# Patient Record
Sex: MALE | Race: WHITE | NOT HISPANIC OR LATINO | Employment: FULL TIME | URBAN - METROPOLITAN AREA
[De-identification: names, ages, dates, MRNs, and addresses within clinical notes are randomized per-mention and may not be internally consistent; named-entity substitution may affect disease eponyms.]

---

## 2019-01-03 ENCOUNTER — TELEPHONE (OUTPATIENT)
Dept: FAMILY MEDICINE | Facility: CLINIC | Age: 44
End: 2019-01-03

## 2019-01-03 DIAGNOSIS — B07.0 PLANTAR WART: Primary | ICD-10-CM

## 2019-01-09 ENCOUNTER — OFFICE VISIT (OUTPATIENT)
Dept: PODIATRY | Facility: CLINIC | Age: 44
End: 2019-01-09
Payer: COMMERCIAL

## 2019-01-09 VITALS
WEIGHT: 148 LBS | DIASTOLIC BLOOD PRESSURE: 88 MMHG | BODY MASS INDEX: 24.66 KG/M2 | HEART RATE: 84 BPM | SYSTOLIC BLOOD PRESSURE: 137 MMHG | HEIGHT: 65 IN

## 2019-01-09 DIAGNOSIS — M79.671 FOOT PAIN, BILATERAL: ICD-10-CM

## 2019-01-09 DIAGNOSIS — M79.672 FOOT PAIN, BILATERAL: ICD-10-CM

## 2019-01-09 DIAGNOSIS — M72.2 PLANTAR FIBROMATOSIS: Primary | ICD-10-CM

## 2019-01-09 PROCEDURE — 99203 OFFICE O/P NEW LOW 30 MIN: CPT | Mod: 25,S$GLB,, | Performed by: PODIATRIST

## 2019-01-09 PROCEDURE — 3008F PR BODY MASS INDEX (BMI) DOCUMENTED: ICD-10-PCS | Mod: CPTII,S$GLB,, | Performed by: PODIATRIST

## 2019-01-09 PROCEDURE — 99999 PR PBB SHADOW E&M-EST. PATIENT-LVL III: ICD-10-PCS | Mod: PBBFAC,,, | Performed by: PODIATRIST

## 2019-01-09 PROCEDURE — 99203 PR OFFICE/OUTPT VISIT, NEW, LEVL III, 30-44 MIN: ICD-10-PCS | Mod: 25,S$GLB,, | Performed by: PODIATRIST

## 2019-01-09 PROCEDURE — 3008F BODY MASS INDEX DOCD: CPT | Mod: CPTII,S$GLB,, | Performed by: PODIATRIST

## 2019-01-09 PROCEDURE — 99999 PR PBB SHADOW E&M-EST. PATIENT-LVL III: CPT | Mod: PBBFAC,,, | Performed by: PODIATRIST

## 2019-01-09 PROCEDURE — 29540 STRAPPING ANKLE &/FOOT: CPT | Mod: 50,S$GLB,, | Performed by: PODIATRIST

## 2019-01-09 PROCEDURE — 29540 PR STRAPPING; ANKLE &/OR FOOT: ICD-10-PCS | Mod: 50,S$GLB,, | Performed by: PODIATRIST

## 2019-01-09 RX ORDER — LIDOCAINE HYDROCHLORIDE 20 MG/ML
JELLY TOPICAL
Qty: 30 ML | Refills: 2 | Status: SHIPPED | OUTPATIENT
Start: 2019-01-09 | End: 2019-04-15

## 2019-01-09 NOTE — LETTER
January 9, 2019      Arthur Vidal Jr., MD  [address]  [address]  [facility] [address]           [facility] - Podiatry  [address]  [place] [address]  Phone: [phone]          Patient: Lance Hampton   MR Number: [id number]   YOB: 1975   Date of Visit: 1/9/2019       Dear Dr. Arthur Vidal Jr.:    Thank you for referring Lance Hampton to me for evaluation. Attached you will find relevant portions of my assessment and plan of care.    If you have questions, please do not hesitate to call me. I look forward to following Lance Hampton along with you.    Sincerely,    Hector Rogers, DPKATARZYNA    Enclosure  CC:  No Recipients    If you would like to receive this communication electronically, please contact [email or web address] or [phone] to request more information on [employer] Link access.    For providers and/or their staff who would like to refer a patient to [facility], please contact us through our one-stop-shop provider referral line, [facility], at [phone].    If you feel you have received this communication in error or would no longer like to receive these types of communications, please e-mail [email or web address]

## 2019-01-09 NOTE — PROGRESS NOTES
Subjective:      Patient ID: Lance Hampton is a 43 y.o. male.    Chief Complaint: Plantar Warts    Throbbing burning pain with bump bottom arches right and left.  Gradual onset, worsening over past several months, aggravated by increased weight bearing, shoe gear, pressure.  No previous medical treatment.  OTC pain med not helping. Denies trauma, surgery.    Review of Systems   Constitution: Negative for chills, diaphoresis, fever, malaise/fatigue and night sweats.   Cardiovascular: Negative for claudication, cyanosis, leg swelling and syncope.   Skin: Positive for suspicious lesions. Negative for color change, dry skin, nail changes, rash and unusual hair distribution.   Musculoskeletal: Negative for falls, joint pain, joint swelling, muscle cramps, muscle weakness and stiffness.   Gastrointestinal: Negative for constipation, diarrhea, nausea and vomiting.   Neurological: Negative for brief paralysis, disturbances in coordination, focal weakness, numbness, paresthesias, sensory change and tremors.           Objective:      Physical Exam   Constitutional: He is oriented to person, place, and time. He appears well-developed and well-nourished. He is cooperative. No distress.   Cardiovascular:   Pulses:       Popliteal pulses are 2+ on the right side, and 2+ on the left side.        Dorsalis pedis pulses are 2+ on the right side, and 2+ on the left side.        Posterior tibial pulses are 2+ on the right side, and 2+ on the left side.   Capillary refill 3 seconds all toes/distal feet, all toes/both feet warm to touch.      Negative lymphadenopathy bilateral popliteal fossa and tarsal tunnel.      Negavie lower extremity edema bilateral.     Musculoskeletal:        Right ankle: He exhibits normal range of motion, no swelling, no ecchymosis, no deformity, no laceration and normal pulse. Achilles tendon normal. Achilles tendon exhibits no pain, no defect and normal Sandhu's test results.   Normal angle, base,  station of gait. All ten toes without clubbing, cyanosis, or signs of ischemia.  No pain to palpation bilateral lower extremities.  Range of motion, stability, muscle strength, and muscle tone normal bilateral feet and legs.     Lymphadenopathy: No inguinal adenopathy noted on the right or left side.   Negative lymphadenopathy bilateral popliteal fossa and tarsal tunnel.    Negative lymphangitic streaking bilateral feet/ankles/legs.   Neurological: He is alert and oriented to person, place, and time. He has normal strength. He displays no atrophy and no tremor. No sensory deficit. He exhibits normal muscle tone. Gait normal.   Reflex Scores:       Patellar reflexes are 2+ on the right side and 2+ on the left side.       Achilles reflexes are 2+ on the right side and 2+ on the left side.  Negative tinel sign to percussion sural, superficial peroneal, deep peroneal, saphenous, and posterior tibial nerves right and left ankles and feet.     Skin: Skin is warm, dry and intact. Capillary refill takes 2 to 3 seconds. No abrasion, no bruising, no burn, no ecchymosis, no laceration, no lesion and no rash noted. He is not diaphoretic. No cyanosis or erythema. No pallor. Nails show no clubbing.     Painful mass plantar fascial medial band right and left with non mobile mass associated  without ulceration, drainage, pus, tracking, fluctuance, malodor, or cardinal signs infection.     Otherwise, Skin is normal age and health appropriate color, turgor, texture, and temperature bilateral lower extremities without ulceration, hyperpigmentation, discoloration, masses nodules or cords palpated.  No ecchymosis, erythema, edema, or cardinal signs of infection bilateral lower extremities.     Psychiatric: He has a normal mood and affect.             Assessment:       Encounter Diagnoses   Name Primary?    Plantar fibromatosis Yes    Foot pain, bilateral          Plan:       Lance was seen today for plantar warts.    Diagnoses and all  orders for this visit:    Plantar fibromatosis  -     ORTHOTIC DEVICE (DME)    Foot pain, bilateral  -     ORTHOTIC DEVICE (DME)    Other orders  -     lidocaine HCL 2% (XYLOCAINE) 2 % jelly; Apply topically as needed.      I counseled the patient on his conditions, their implications and medical management.        Patient will stretch the tendo achilles complex three times daily as demonstrated in the office.  Literature was dispensed illustrating proper stretching technique.    I applied a plantar rest strapping to the patient's  Right and left foot to offload symptomatic area, support the arch, and relieve pain.    Patient will obtain over the counter arch supports and wear them in shoes whenever possible.  Athletic shoes intended for walking or running are usually best.    The patient was advised that NSAID-type medications have two very important potential side effects: gastrointestinal irritation including hemorrhage and renal injuries. He was asked to take the medication with food and to stop if he experiences any GI upset. I asked him to call for vomiting, abdominal pain or black/bloody stools. The patient expresses understanding of these issues and questions were answered.    Discussed conservative treatment with shoes of adequate dimensions, material, and style to alleviate symptoms and delay or prevent surgical intervention.    Rx xrays, lidocaine.          Follow-up if symptoms worsen or fail to improve.

## 2019-04-15 ENCOUNTER — OFFICE VISIT (OUTPATIENT)
Dept: FAMILY MEDICINE | Facility: CLINIC | Age: 44
End: 2019-04-15
Attending: FAMILY MEDICINE
Payer: COMMERCIAL

## 2019-04-15 VITALS
SYSTOLIC BLOOD PRESSURE: 114 MMHG | OXYGEN SATURATION: 97 % | BODY MASS INDEX: 24.92 KG/M2 | DIASTOLIC BLOOD PRESSURE: 70 MMHG | HEART RATE: 77 BPM | WEIGHT: 146 LBS | HEIGHT: 64 IN

## 2019-04-15 DIAGNOSIS — E78.5 HYPERLIPIDEMIA, UNSPECIFIED HYPERLIPIDEMIA TYPE: ICD-10-CM

## 2019-04-15 DIAGNOSIS — R63.4 UNINTENTIONAL WEIGHT LOSS: Primary | ICD-10-CM

## 2019-04-15 DIAGNOSIS — K76.0 FATTY LIVER: ICD-10-CM

## 2019-04-15 DIAGNOSIS — K21.9 GASTROESOPHAGEAL REFLUX DISEASE WITHOUT ESOPHAGITIS: ICD-10-CM

## 2019-04-15 DIAGNOSIS — F10.90 ALCOHOL INTAKE ABOVE RECOMMENDED SENSIBLE LIMITS: ICD-10-CM

## 2019-04-15 DIAGNOSIS — Z12.5 PROSTATE CANCER SCREENING: ICD-10-CM

## 2019-04-15 PROCEDURE — 3008F BODY MASS INDEX DOCD: CPT | Mod: CPTII,S$GLB,, | Performed by: FAMILY MEDICINE

## 2019-04-15 PROCEDURE — 99215 PR OFFICE/OUTPT VISIT, EST, LEVL V, 40-54 MIN: ICD-10-PCS | Mod: S$GLB,,, | Performed by: FAMILY MEDICINE

## 2019-04-15 PROCEDURE — 99999 PR PBB SHADOW E&M-EST. PATIENT-LVL III: ICD-10-PCS | Mod: PBBFAC,,, | Performed by: FAMILY MEDICINE

## 2019-04-15 PROCEDURE — 99999 PR PBB SHADOW E&M-EST. PATIENT-LVL III: CPT | Mod: PBBFAC,,, | Performed by: FAMILY MEDICINE

## 2019-04-15 PROCEDURE — 99215 OFFICE O/P EST HI 40 MIN: CPT | Mod: S$GLB,,, | Performed by: FAMILY MEDICINE

## 2019-04-15 PROCEDURE — 3008F PR BODY MASS INDEX (BMI) DOCUMENTED: ICD-10-PCS | Mod: CPTII,S$GLB,, | Performed by: FAMILY MEDICINE

## 2019-04-15 NOTE — PROGRESS NOTES
"Subjective:       Patient ID: Lance Hampton is a 43 y.o. male.    Chief Complaint: Weight Loss    HPI   The patient is concerned about a possible 10 lb weight loss over the past few months..  He admits to poor sleep; decreased appetite; high alcohol intake. No exercise.      Patient Active Problem List   Diagnosis    Gastroesophageal reflux disease without esophagitis    Fatty liver    AR (allergic rhinitis)    Hyperlipidemia    Biceps tendonitis on left    Left shoulder pain    Alcohol intake above recommended sensible limits     No current outpatient medications on file.    The following portions of the patient's history were reviewed and updated as appropriate: allergies, past family history, past medical history, past social history and past surgical history.    Review of Systems   Constitutional: Positive for appetite change and unexpected weight change. Negative for fatigue.   HENT: Negative for ear discharge, ear pain, hearing loss, tinnitus and voice change.    Eyes: Negative for pain.   Respiratory: Negative for cough and shortness of breath.    Cardiovascular: Negative for chest pain, palpitations and leg swelling.   Gastrointestinal: Positive for constipation. Negative for abdominal pain, blood in stool, diarrhea, nausea and vomiting.   Genitourinary: Negative for decreased urine volume, difficulty urinating, dysuria, enuresis, frequency, hematuria and urgency.   Musculoskeletal: Negative for arthralgias, back pain and myalgias.   Skin: Negative for rash.   Neurological: Negative for dizziness, weakness, light-headedness and headaches.   Hematological: Does not bruise/bleed easily.   Psychiatric/Behavioral: Positive for sleep disturbance. Negative for dysphoric mood. The patient is not nervous/anxious.          Objective:      /70 (BP Location: Left arm, Patient Position: Sitting, BP Method: Small (Manual))   Pulse 77   Ht 5' 4" (1.626 m)   Wt 66.2 kg (146 lb)   SpO2 97%   BMI " "25.06 kg/m²     Physical Exam   Constitutional: He is oriented to person, place, and time. He appears well-developed and well-nourished. He is cooperative.   HENT:   Head: Normocephalic and atraumatic.   Right Ear: External ear normal.   Left Ear: External ear normal.   Nose: Nose normal.   Mouth/Throat: Oropharynx is clear and moist and mucous membranes are normal. No oropharyngeal exudate.   Eyes: Conjunctivae are normal. No scleral icterus.   Neck: Neck supple. No JVD present. Carotid bruit is not present. No thyromegaly present.   Cardiovascular: Normal rate, regular rhythm, normal heart sounds and normal pulses. Exam reveals no gallop and no friction rub.   No murmur heard.  Pulmonary/Chest: Effort normal and breath sounds normal. He has no wheezes. He has no rhonchi. He has no rales.   Abdominal: Soft. Bowel sounds are normal. He exhibits no distension and no mass. There is no splenomegaly or hepatomegaly. There is no tenderness.   Musculoskeletal: Normal range of motion. He exhibits no edema or tenderness.   Lymphadenopathy:     He has no cervical adenopathy.     He has no axillary adenopathy.   Neurological: He is alert and oriented to person, place, and time. He has normal strength and normal reflexes. No cranial nerve deficit or sensory deficit. Coordination normal.   Skin: Skin is warm and dry.   Psychiatric: He has a normal mood and affect.   Vitals reviewed.        Assessment:       1. Unintentional weight loss    2. Alcohol intake above recommended sensible limits    3. Fatty liver    4. Hyperlipidemia, unspecified hyperlipidemia type    5. Gastroesophageal reflux disease without esophagitis        Plan:       Labs (see Orders).  Recommended reduce EtOH intake by 50%.  Consider sleep aid HS to restore restful sleep pattern.    "This note will not be shared with the patient."  "

## 2019-04-15 NOTE — PATIENT INSTRUCTIONS
Lance,     We are always striving for excellence. Should you receive a patient experience survey in the mail, we would appreciate if you would take a few moments to give us your feedback. These surveys let us know our strengths as well as areas of opportunity for improvement to better serve you.    Thank you for your time,  Oneyda Gabriel LPN    Test results will be communicated to you via : [facility], Telephone or Letter.   If you have not received test results in one week, please contact the clinic at   [phone].

## 2019-04-16 ENCOUNTER — LAB VISIT (OUTPATIENT)
Dept: LAB | Facility: HOSPITAL | Age: 44
End: 2019-04-16
Attending: FAMILY MEDICINE
Payer: COMMERCIAL

## 2019-04-16 DIAGNOSIS — Z12.5 PROSTATE CANCER SCREENING: ICD-10-CM

## 2019-04-16 DIAGNOSIS — R63.4 UNINTENTIONAL WEIGHT LOSS: ICD-10-CM

## 2019-04-16 DIAGNOSIS — K76.0 FATTY LIVER: ICD-10-CM

## 2019-04-16 DIAGNOSIS — F10.90 ALCOHOL INTAKE ABOVE RECOMMENDED SENSIBLE LIMITS: ICD-10-CM

## 2019-04-16 DIAGNOSIS — E78.5 HYPERLIPIDEMIA, UNSPECIFIED HYPERLIPIDEMIA TYPE: ICD-10-CM

## 2019-04-16 LAB
ALBUMIN SERPL BCP-MCNC: 4.5 G/DL (ref 3.5–5.2)
ALP SERPL-CCNC: 97 U/L (ref 55–135)
ALT SERPL W/O P-5'-P-CCNC: 242 U/L (ref 10–44)
ANION GAP SERPL CALC-SCNC: 11 MMOL/L (ref 8–16)
AST SERPL-CCNC: 204 U/L (ref 10–40)
BASOPHILS # BLD AUTO: 0.03 K/UL (ref 0–0.2)
BASOPHILS NFR BLD: 0.6 % (ref 0–1.9)
BILIRUB SERPL-MCNC: 0.8 MG/DL (ref 0.1–1)
BUN SERPL-MCNC: 5 MG/DL (ref 6–20)
CALCIUM SERPL-MCNC: 10.1 MG/DL (ref 8.7–10.5)
CHLORIDE SERPL-SCNC: 99 MMOL/L (ref 95–110)
CO2 SERPL-SCNC: 29 MMOL/L (ref 23–29)
COMPLEXED PSA SERPL-MCNC: 0.74 NG/ML (ref 0–4)
CREAT SERPL-MCNC: 0.8 MG/DL (ref 0.5–1.4)
CRP SERPL-MCNC: 2.7 MG/L (ref 0–3.19)
DIFFERENTIAL METHOD: ABNORMAL
EOSINOPHIL # BLD AUTO: 0.3 K/UL (ref 0–0.5)
EOSINOPHIL NFR BLD: 5.7 % (ref 0–8)
ERYTHROCYTE [DISTWIDTH] IN BLOOD BY AUTOMATED COUNT: 12.8 % (ref 11.5–14.5)
EST. GFR  (AFRICAN AMERICAN): >60 ML/MIN/1.73 M^2
EST. GFR  (NON AFRICAN AMERICAN): >60 ML/MIN/1.73 M^2
GLUCOSE SERPL-MCNC: 78 MG/DL (ref 70–110)
HCT VFR BLD AUTO: 45.7 % (ref 40–54)
HGB BLD-MCNC: 15.1 G/DL (ref 14–18)
IMM GRANULOCYTES # BLD AUTO: 0.01 K/UL (ref 0–0.04)
IMM GRANULOCYTES NFR BLD AUTO: 0.2 % (ref 0–0.5)
LYMPHOCYTES # BLD AUTO: 1.2 K/UL (ref 1–4.8)
LYMPHOCYTES NFR BLD: 26.1 % (ref 18–48)
MCH RBC QN AUTO: 33 PG (ref 27–31)
MCHC RBC AUTO-ENTMCNC: 33 G/DL (ref 32–36)
MCV RBC AUTO: 100 FL (ref 82–98)
MONOCYTES # BLD AUTO: 0.6 K/UL (ref 0.3–1)
MONOCYTES NFR BLD: 12.5 % (ref 4–15)
NEUTROPHILS # BLD AUTO: 2.6 K/UL (ref 1.8–7.7)
NEUTROPHILS NFR BLD: 54.9 % (ref 38–73)
NRBC BLD-RTO: 0 /100 WBC
PLATELET # BLD AUTO: 328 K/UL (ref 150–350)
PMV BLD AUTO: 10.2 FL (ref 9.2–12.9)
POTASSIUM SERPL-SCNC: 4.4 MMOL/L (ref 3.5–5.1)
PROT SERPL-MCNC: 8.2 G/DL (ref 6–8.4)
RBC # BLD AUTO: 4.57 M/UL (ref 4.6–6.2)
SODIUM SERPL-SCNC: 139 MMOL/L (ref 136–145)
T4 FREE SERPL-MCNC: 0.79 NG/DL (ref 0.71–1.51)
TSH SERPL DL<=0.005 MIU/L-ACNC: 1.03 UIU/ML (ref 0.4–4)
URATE SERPL-MCNC: 5.3 MG/DL (ref 3.4–7)
WBC # BLD AUTO: 4.71 K/UL (ref 3.9–12.7)

## 2019-04-16 PROCEDURE — 86141 C-REACTIVE PROTEIN HS: CPT

## 2019-04-16 PROCEDURE — 36415 COLL VENOUS BLD VENIPUNCTURE: CPT | Mod: PO

## 2019-04-16 PROCEDURE — 80053 COMPREHEN METABOLIC PANEL: CPT

## 2019-04-16 PROCEDURE — 80061 LIPID PANEL: CPT

## 2019-04-16 PROCEDURE — 84550 ASSAY OF BLOOD/URIC ACID: CPT

## 2019-04-16 PROCEDURE — 84439 ASSAY OF FREE THYROXINE: CPT

## 2019-04-16 PROCEDURE — 84153 ASSAY OF PSA TOTAL: CPT

## 2019-04-16 PROCEDURE — 84443 ASSAY THYROID STIM HORMONE: CPT

## 2019-04-16 PROCEDURE — 85025 COMPLETE CBC W/AUTO DIFF WBC: CPT

## 2019-04-16 PROCEDURE — 82977 ASSAY OF GGT: CPT

## 2019-04-17 ENCOUNTER — TELEPHONE (OUTPATIENT)
Dept: FAMILY MEDICINE | Facility: CLINIC | Age: 44
End: 2019-04-17

## 2019-04-17 DIAGNOSIS — K70.10 ALCOHOLIC STEATOHEPATITIS: Primary | ICD-10-CM

## 2019-04-17 LAB — GGT SERPL-CCNC: 670 U/L (ref 8–55)

## 2019-04-17 NOTE — TELEPHONE ENCOUNTER
Reviewed results with patient by phone.  He agrees to significant reduction in alcohol intake, but not complete abstention.

## 2019-04-20 LAB
CHOLEST SERPL-MCNC: 302 MG/DL
HDL SERPL QN: 10.5 NM
HDL SERPL-SCNC: 37.3 UMOL/L
HDLC SERPL-MCNC: 91 MG/DL (ref 40–59)
HLD.LARGE SERPL-SCNC: >17 UMOL/L
LDL SERPL QN: 21.8 NM
LDL SERPL-SCNC: 2111 NMOL/L
LDL SMALL SERPL-SCNC: 414 NMOL/L
LDLC SERPL CALC-MCNC: 193 MG/DL
PATHOLOGY STUDY: ABNORMAL
TRIGL SERPL-MCNC: 88 MG/DL (ref 30–149)
VLDL LARGE SERPL-SCNC: 2 NMOL/L
VLDL SERPL QN: 47.5 NM

## 2019-04-25 ENCOUNTER — HOSPITAL ENCOUNTER (OUTPATIENT)
Dept: RADIOLOGY | Facility: HOSPITAL | Age: 44
Discharge: HOME | End: 2019-04-25
Attending: FAMILY MEDICINE
Payer: COMMERCIAL

## 2019-04-25 DIAGNOSIS — K70.10 ALCOHOLIC STEATOHEPATITIS: ICD-10-CM

## 2019-04-25 PROCEDURE — 91200 LIVER ELASTOGRAPHY: CPT | Mod: TC

## 2019-04-25 PROCEDURE — 91200 LIVER ELASTOGRAPHY: CPT | Mod: 26,,, | Performed by: RADIOLOGY

## 2019-04-25 PROCEDURE — 91200 US ELASTOGRAPHY LIVER: ICD-10-PCS | Mod: 26,,, | Performed by: RADIOLOGY

## 2019-04-26 ENCOUNTER — PATIENT MESSAGE (OUTPATIENT)
Dept: FAMILY MEDICINE | Facility: CLINIC | Age: 44
End: 2019-04-26

## 2019-04-29 ENCOUNTER — PATIENT MESSAGE (OUTPATIENT)
Dept: FAMILY MEDICINE | Facility: CLINIC | Age: 44
End: 2019-04-29

## 2019-04-29 DIAGNOSIS — K70.10 ALCOHOLIC STEATOHEPATITIS: Primary | ICD-10-CM

## 2019-05-23 ENCOUNTER — OFFICE VISIT (OUTPATIENT)
Dept: HEPATOLOGY | Facility: CLINIC | Age: 44
End: 2019-05-23
Payer: COMMERCIAL

## 2019-05-23 ENCOUNTER — LAB VISIT (OUTPATIENT)
Dept: LAB | Facility: HOSPITAL | Age: 44
End: 2019-05-23
Payer: COMMERCIAL

## 2019-05-23 VITALS
HEIGHT: 64 IN | HEART RATE: 103 BPM | WEIGHT: 147.5 LBS | RESPIRATION RATE: 16 BRPM | SYSTOLIC BLOOD PRESSURE: 132 MMHG | TEMPERATURE: 99 F | BODY MASS INDEX: 25.18 KG/M2 | DIASTOLIC BLOOD PRESSURE: 76 MMHG

## 2019-05-23 DIAGNOSIS — R74.8 ELEVATED LIVER ENZYMES: ICD-10-CM

## 2019-05-23 DIAGNOSIS — R79.89 ELEVATED FERRITIN: ICD-10-CM

## 2019-05-23 DIAGNOSIS — R74.8 ELEVATED LIVER ENZYMES: Primary | ICD-10-CM

## 2019-05-23 LAB
ALBUMIN SERPL BCP-MCNC: 4.3 G/DL (ref 3.5–5.2)
ALP SERPL-CCNC: 104 U/L (ref 55–135)
ALT SERPL W/O P-5'-P-CCNC: 257 U/L (ref 10–44)
ANION GAP SERPL CALC-SCNC: 8 MMOL/L (ref 8–16)
AST SERPL-CCNC: 246 U/L (ref 10–40)
BASOPHILS # BLD AUTO: 0.02 K/UL (ref 0–0.2)
BASOPHILS NFR BLD: 0.3 % (ref 0–1.9)
BILIRUB SERPL-MCNC: 0.6 MG/DL (ref 0.1–1)
BUN SERPL-MCNC: 7 MG/DL (ref 6–20)
CALCIUM SERPL-MCNC: 10.4 MG/DL (ref 8.7–10.5)
CHLORIDE SERPL-SCNC: 100 MMOL/L (ref 95–110)
CO2 SERPL-SCNC: 30 MMOL/L (ref 23–29)
CREAT SERPL-MCNC: 0.9 MG/DL (ref 0.5–1.4)
DIFFERENTIAL METHOD: ABNORMAL
EOSINOPHIL # BLD AUTO: 0.4 K/UL (ref 0–0.5)
EOSINOPHIL NFR BLD: 6.4 % (ref 0–8)
ERYTHROCYTE [DISTWIDTH] IN BLOOD BY AUTOMATED COUNT: 12.3 % (ref 11.5–14.5)
EST. GFR  (AFRICAN AMERICAN): >60 ML/MIN/1.73 M^2
EST. GFR  (NON AFRICAN AMERICAN): >60 ML/MIN/1.73 M^2
GLUCOSE SERPL-MCNC: 98 MG/DL (ref 70–110)
HCT VFR BLD AUTO: 45.4 % (ref 40–54)
HGB BLD-MCNC: 15.2 G/DL (ref 14–18)
IGG SERPL-MCNC: 1023 MG/DL (ref 650–1600)
IMM GRANULOCYTES # BLD AUTO: 0.02 K/UL (ref 0–0.04)
IMM GRANULOCYTES NFR BLD AUTO: 0.3 % (ref 0–0.5)
LYMPHOCYTES # BLD AUTO: 1.4 K/UL (ref 1–4.8)
LYMPHOCYTES NFR BLD: 21.8 % (ref 18–48)
MCH RBC QN AUTO: 32.8 PG (ref 27–31)
MCHC RBC AUTO-ENTMCNC: 33.5 G/DL (ref 32–36)
MCV RBC AUTO: 98 FL (ref 82–98)
MONOCYTES # BLD AUTO: 0.8 K/UL (ref 0.3–1)
MONOCYTES NFR BLD: 12.7 % (ref 4–15)
NEUTROPHILS # BLD AUTO: 3.7 K/UL (ref 1.8–7.7)
NEUTROPHILS NFR BLD: 58.5 % (ref 38–73)
NRBC BLD-RTO: 0 /100 WBC
PLATELET # BLD AUTO: 255 K/UL (ref 150–350)
PMV BLD AUTO: 10.5 FL (ref 9.2–12.9)
POTASSIUM SERPL-SCNC: 4.1 MMOL/L (ref 3.5–5.1)
PROT SERPL-MCNC: 7.9 G/DL (ref 6–8.4)
RBC # BLD AUTO: 4.63 M/UL (ref 4.6–6.2)
SODIUM SERPL-SCNC: 138 MMOL/L (ref 136–145)
WBC # BLD AUTO: 6.29 K/UL (ref 3.9–12.7)

## 2019-05-23 PROCEDURE — 99214 PR OFFICE/OUTPT VISIT, EST, LEVL IV, 30-39 MIN: ICD-10-PCS | Mod: S$GLB,,, | Performed by: PHYSICIAN ASSISTANT

## 2019-05-23 PROCEDURE — 99999 PR PBB SHADOW E&M-EST. PATIENT-LVL III: CPT | Mod: PBBFAC,,, | Performed by: PHYSICIAN ASSISTANT

## 2019-05-23 PROCEDURE — 82784 ASSAY IGA/IGD/IGG/IGM EACH: CPT

## 2019-05-23 PROCEDURE — 99214 OFFICE O/P EST MOD 30 MIN: CPT | Mod: S$GLB,,, | Performed by: PHYSICIAN ASSISTANT

## 2019-05-23 PROCEDURE — 3008F BODY MASS INDEX DOCD: CPT | Mod: CPTII,S$GLB,, | Performed by: PHYSICIAN ASSISTANT

## 2019-05-23 PROCEDURE — 85025 COMPLETE CBC W/AUTO DIFF WBC: CPT

## 2019-05-23 PROCEDURE — 86704 HEP B CORE ANTIBODY TOTAL: CPT

## 2019-05-23 PROCEDURE — 86235 NUCLEAR ANTIGEN ANTIBODY: CPT | Mod: 91

## 2019-05-23 PROCEDURE — 86256 FLUORESCENT ANTIBODY TITER: CPT

## 2019-05-23 PROCEDURE — 87340 HEPATITIS B SURFACE AG IA: CPT

## 2019-05-23 PROCEDURE — 86038 ANTINUCLEAR ANTIBODIES: CPT

## 2019-05-23 PROCEDURE — 80321 ALCOHOLS BIOMARKERS 1OR 2: CPT

## 2019-05-23 PROCEDURE — 86790 VIRUS ANTIBODY NOS: CPT

## 2019-05-23 PROCEDURE — 36415 COLL VENOUS BLD VENIPUNCTURE: CPT

## 2019-05-23 PROCEDURE — 86803 HEPATITIS C AB TEST: CPT

## 2019-05-23 PROCEDURE — 3008F PR BODY MASS INDEX (BMI) DOCUMENTED: ICD-10-PCS | Mod: CPTII,S$GLB,, | Performed by: PHYSICIAN ASSISTANT

## 2019-05-23 PROCEDURE — 80053 COMPREHEN METABOLIC PANEL: CPT

## 2019-05-23 PROCEDURE — 99999 PR PBB SHADOW E&M-EST. PATIENT-LVL III: ICD-10-PCS | Mod: PBBFAC,,, | Performed by: PHYSICIAN ASSISTANT

## 2019-05-23 PROCEDURE — 86706 HEP B SURFACE ANTIBODY: CPT

## 2019-05-23 NOTE — PROGRESS NOTES
HEPATOLOGY CLINIC VISIT NOTE     REFERRING PROVIDER: Dr. Arthur Vidal Jr.    REASON FOR VISIT: elevated liver enzymes     HISTORY: This is a 43 y.o. White male here for evaluation of elevated liver enzymes, referred by PCP. He has seen hepatology in past and etiology was alcohol and NAFLD. Fibrosure at the time suggested no significant fibrosis at F0-F1.  His serological workup was negative for Florencio's, alpha-1 antitrypsin deficiency, autoimmune etiology, and viral hepatitis. His ferritin level was elevated again normal serum iron and iron sat. HH DNA analysis was negative for both C282Y and H63D genes.     On most recent labs, , . Tbili WNL, albumin 4.5  PLTs 328,     U/S done 04/2014 noting hepatic steatosis, spleen 9.6 cm, pt had recent elastography F2-F3    (+) FH of cirrhosis in grandfather. PMH is listed below. H/o daily alcohol use, on average 3 glasses of wine per day, maybe more on weekends for several years. Only current meds are OTC allergy. Pt recently levive juice supplement for his liver as a recommendation from a friend.     Pt denies signs of hepatic decompensation including: jaundice, dark urine, abdominal distention, hematemesis, melena, slowed mentation.    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     Past Surgical History:   Procedure Laterality Date    CHOLECYSTECTOMY  2013    CHOLECYSTECTOMY, LAPAROSCOPIC N/A 7/8/2013    Performed by Sony Mak MD at [facility] OR [address] [place] [address]    TESTICLE SURGERY Left     hydrocelectomy     FAMILY HISTORY: Negative for liver disease  Grandfather with cirrhosis    SOCIAL HISTORY:   Works in sales    Social History     Tobacco Use   Smoking Status  Never Smoker   Smokeless Tobacco Current User   Tobacco Comment    2 times a month     Social History     Substance and Sexual Activity   Alcohol Use Yes    Alcohol/week: 10.2 - 14.4 oz    Types: 3 Shots of liquor, 14 - 21 Glasses of wine per week    Frequency: 4 or more times a week    Drinks per session: 3 or 4   6-7 days per week, 3 glasses of wine per day on average  Stopped for 4.5 years 12 years ago     Social History     Substance and Sexual Activity   Drug Use No     ROS:   No fever, chills  No chest pain, dyspnea, cough  No abdominal pain,nausea, vomiting  No headaches, visual changes  No lower extremity edema  No depression or anxiety    PHYSICAL EXAM:  Friendly White male, in no acute distress; alert and oriented to person, place and time  VITALS: reviewed  HEENT: Sclerae anicteric.   NECK: Supple  CVS: Regular rate and rhythm. No murmurs  LUNGS: Normal respiratory effort. Clear bilaterally  ABDOMEN: Flat, soft, nontender. No organomegaly or masses. No ascites or hernias  SKIN: Warm and dry. No jaundice, No obvious rashes.   EXTREMITIES: No lower extremity edema  NEURO/PSYCH: Normal gate. Memory intact. Thought and speech pattern appropriate. Behavior normal. No depression or anxiety noted.    RECENT LABS:  Lab Results   Component Value Date    WBC 4.71 04/16/2019    HGB 15.1 04/16/2019     04/16/2019     Lab Results   Component Value Date    INR 1.0 03/18/2014     Lab Results   Component Value Date     (H) 04/16/2019     (H) 04/16/2019    BILITOT 0.8 04/16/2019    ALBUMIN 4.5 04/16/2019    ALKPHOS 97 04/16/2019    CREATININE 0.8 04/16/2019    BUN 5 (L) 04/16/2019     04/16/2019    K 4.4 04/16/2019     RECENT IMAGING:  US Abdomen Complete with Doppler (xpd)   Order: [id number]   Status:  Final result   Visible to patient:  Yes (Patient Portal) Next appt:  Today at 01:00 PM in Hepatology (Jose L Busby PA-C) Dx:  Elevated LFTs; Elevated ferritin   Details     Reading  Physician Reading Date Result Priority   Efrain Velazquez MD 4/2/2014       Narrative     The liver measures 14 cm in length with normal parenchyma and no focal defects.  The common bile duct is not dilated.  The gallbladder is been removed.  Right kidney measures 10 cm, left kidney measures 10.2 cm.  The spleen measures 9.6 cm.  There is an   area of focal fatty sparing in the right lobe.    Doppler study showed forward flow in the portal veins, hepatic veins, superior mesenteric vein, inferior vena cava and hepatic artery.  Celiac artery is normal in expiration.  No ascites or collateral obesity.      Impression      No significant abnormalities seen.  Fatty liver with focal fatty sparing           ASSESSMENT  43 y.o. White male with:  1. ELEVATED LIVER ENZYMES  -- suspect underlying elevation due to ETOH  -- update AI markers and viral serologies  -- repeat U/S   -- plan to repeat enzymes once d/stefan alcohol for several weeks    2. ELEVATED FERRITIN  -- negative HHDNA, suspect due to ETOH    PLAN:  1. Labs today  2. U/S  3. F/u TBD  4. D/c alcohol     Thank you for allowing me to participate in the care of Lance Busby PA-C

## 2019-05-23 NOTE — LETTER
May 23, 2019      Arthur Vidal Jr., MD  [address]  [address]  [facility] [address]           [facility] - Hepatology  [address]  [facility] [address]  Phone: [phone]  Fax: [phone]          Patient: Lance Hampton   MR Number: [id number]   YOB: 1975   Date of Visit: 5/23/2019       Dear Dr. Arthur Vidal Jr.:    Thank you for referring Lance Hampton to me for evaluation. Attached you will find relevant portions of my assessment and plan of care.    If you have questions, please do not hesitate to call me. I look forward to following Lance Hampton along with you.    Sincerely,    KAY Haywood  CC:  No Recipients    If you would like to receive this communication electronically, please contact [email or web address] or [phone] to request more information on [employer] access.    For providers and/or their staff who would like to refer a patient to [facility], please contact us through our one-stop-shop provider referral line, [facility], at [phone].    If you feel you have received this communication in error or would no longer like to receive these types of communications, please e-mail [email or web address]

## 2019-05-23 NOTE — PROGRESS NOTES
I have reviewed and concur with the BRENDA's history, physical, assessment, and plan.  I have personally interviewed and examined the patient at bedside.  See below addendum for my evaluation and additional findings.     43 y.o. male that presents for evaluation of elevated liver tests.  Previously seen by Renee Stevenson with negative serologic work-up.  Reports daily alcohol intake.  Recently started supplement drink.  Enzymes more elevated currently with fibrosure suggestive of increased fibrosis.  Discussed trial of alcohol abstinence and patient is willing.  Will contact when off of alcohol for at least 30 days.  Limited serologic work-up to exclude other causes such as autoimmune.    No symptoms of chronic liver disease and will obtain fibroscan for more accurate fibrosis staging     Patient will return to clinic with ROSS Ashley

## 2019-05-24 LAB
ANA SER QL IF: NORMAL
HBV CORE AB SERPL QL IA: NEGATIVE
HBV SURFACE AB SER-ACNC: ABNORMAL M[IU]/ML
HBV SURFACE AG SERPL QL IA: NEGATIVE
HCV AB SERPL QL IA: NEGATIVE
HEPATITIS A ANTIBODY, IGG: POSITIVE
MITOCHONDRIA AB TITR SER IF: NORMAL {TITER}
SMOOTH MUSCLE AB TITR SER IF: NORMAL {TITER}

## 2019-05-31 ENCOUNTER — HOSPITAL ENCOUNTER (OUTPATIENT)
Dept: RADIOLOGY | Facility: HOSPITAL | Age: 44
Discharge: HOME | End: 2019-05-31
Attending: PHYSICIAN ASSISTANT
Payer: COMMERCIAL

## 2019-05-31 DIAGNOSIS — R74.8 ELEVATED LIVER ENZYMES: ICD-10-CM

## 2019-05-31 LAB — PHOSPHATIDYLETHANOL (PETH): 1084 NG/ML

## 2019-05-31 PROCEDURE — 76700 US EXAM ABDOM COMPLETE: CPT | Mod: TC

## 2019-05-31 PROCEDURE — 76700 US ABDOMEN COMPLETE: ICD-10-PCS | Mod: 26,,, | Performed by: RADIOLOGY

## 2019-05-31 PROCEDURE — 76700 US EXAM ABDOM COMPLETE: CPT | Mod: 26,,, | Performed by: RADIOLOGY

## 2019-06-03 ENCOUNTER — TELEPHONE (OUTPATIENT)
Dept: HEPATOLOGY | Facility: CLINIC | Age: 44
End: 2019-06-03

## 2019-06-03 NOTE — TELEPHONE ENCOUNTER
----- Message from Jose L Busby PA-C sent at 6/3/2019 11:10 AM CDT -----  Schedule f/u in 6 months

## 2019-06-10 ENCOUNTER — PATIENT MESSAGE (OUTPATIENT)
Dept: HEPATOLOGY | Facility: CLINIC | Age: 44
End: 2019-06-10

## 2019-06-15 ENCOUNTER — PATIENT MESSAGE (OUTPATIENT)
Dept: HEPATOLOGY | Facility: CLINIC | Age: 44
End: 2019-06-15

## 2019-07-09 NOTE — PROGRESS NOTES
HEPATOLOGY CLINIC VISIT NOTE     REFERRING PROVIDER: No ref. provider found    REASON FOR VISIT: elevated liver enzymes     HISTORY: This is a 43 y.o. White male here for follow up of elevated liver enzymes and liver fibrosis, referred by PCP. He has seen hepatology in past and etiology was alcohol and NAFLD. Fibrosure at the time suggested no significant fibrosis at F0-F1.  His serological workup was negative for Florencio's, alpha-1 antitrypsin deficiency, autoimmune etiology, and viral hepatitis. His ferritin level was elevated again normal serum iron and iron sat. HH DNA analysis was negative for both C282Y and H63D genes.     On most recent labs, , . Tbili WNL, albumin 4.5  PLTs 328,     U/S done 04/2014 noting hepatic steatosis, spleen 9.6 cm, pt had recent elastography F2-F3    At last visit with me, underwent serological eval notable for significant hepatic inflammation (, ) and a (+) PETH <1000, testing negative for AIH, viral hepatitis on repeat.     Since last visit, he reports that he d/stefan alcohol 98%    (+) FH of cirrhosis in grandfather. PMH is listed below. H/o daily alcohol use, on average 3 glasses of wine per day, maybe more on weekends for several years. Only current meds are OTC allergy. Pt recently levive juice supplement for his liver as a recommendation from a friend.     Pt denies signs of hepatic decompensation including: jaundice, dark urine, abdominal distention, hematemesis, melena, slowed mentation.    He underwent fibroscan prior to visit that was F3. Enzymes and PETH will be repeated today to assess whether this is related to inflammation and ETOH use.     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     Past Surgical History:   Procedure Laterality Date    CHOLECYSTECTOMY  2013    CHOLECYSTECTOMY, LAPAROSCOPIC N/A 7/8/2013    Performed by Sony Mak MD at [facility] OR [address] [place] [address]    TESTICLE SURGERY Left     hydrocelectomy     FAMILY HISTORY: Negative for liver disease  Grandfather with cirrhosis    SOCIAL HISTORY:   Works in sales    Social History     Tobacco Use   Smoking Status Never Smoker   Smokeless Tobacco Current User    Types: Snuff   Tobacco Comment    2 times a month     Social History     Substance and Sexual Activity   Alcohol Use Yes    Alcohol/week: 10.2 - 14.4 oz    Types: 14 - 21 Glasses of wine, 3 Shots of liquor per week    Frequency: 4 or more times a week    Drinks per session: 3 or 4   6-7 days per week, 3 glasses of wine per day on average  Stopped for 4.5 years 12 years ago     Social History     Substance and Sexual Activity   Drug Use No     ROS:   No fever, chills  No chest pain, dyspnea, cough  No abdominal pain,nausea, vomiting  No headaches, visual changes  No lower extremity edema  No depression or anxiety    PHYSICAL EXAM:  Friendly White male, in no acute distress; alert and oriented to person, place and time  VITALS: reviewed  HEENT: Sclerae anicteric.   NECK: Supple  LUNGS: Normal respiratory effort.   ABDOMEN: Flat, soft, nontender. No organomegaly or masses. No ascites or hernias  SKIN: Warm and dry. No jaundice, No obvious rashes.   EXTREMITIES: No lower extremity edema  NEURO/PSYCH: Normal gate. Memory intact. Thought and speech pattern appropriate. Behavior normal. No depression or anxiety noted.    RECENT LABS:  Lab Results   Component Value Date    WBC 6.29 05/23/2019    HGB 15.2 05/23/2019     05/23/2019     Lab Results   Component Value Date    INR 1.0 03/18/2014     Lab Results   Component Value Date     (H) 05/23/2019     (H) 05/23/2019    BILITOT 0.6 05/23/2019    ALBUMIN 4.3 05/23/2019    ALKPHOS  104 05/23/2019    CREATININE 0.9 05/23/2019    BUN 7 05/23/2019     05/23/2019    K 4.1 05/23/2019     RECENT IMAGING:  US Abdomen Complete with Doppler (xpd)   Order: [id number]   Status:  Final result   Visible to patient:  Yes (Patient Portal) Next appt:  Today at 01:00 PM in Hepatology (Jose L Busby PA-C) Dx:  Elevated LFTs; Elevated ferritin   Details     Reading Physician Reading Date Result Priority   Efrain Velazquez MD 4/2/2014       Narrative     The liver measures 14 cm in length with normal parenchyma and no focal defects.  The common bile duct is not dilated.  The gallbladder is been removed.  Right kidney measures 10 cm, left kidney measures 10.2 cm.  The spleen measures 9.6 cm.  There is an   area of focal fatty sparing in the right lobe.    Doppler study showed forward flow in the portal veins, hepatic veins, superior mesenteric vein, inferior vena cava and hepatic artery.  Celiac artery is normal in expiration.  No ascites or collateral obesity.      Impression      No significant abnormalities seen.  Fatty liver with focal fatty sparing           ASSESSMENT  43 y.o. White male with:  1. ELEVATED LIVER ENZYMESLIVER FIBROSIS  -- suspect underlying elevation due to ETOH  -- updated AI markers and viral serologies, no negative   -- fibroscan today F3, elastography F2-F3 earlier this year, Fibrosure F0-F1, repeat enzymes and PETH  -- may pursue biopsy versus q6 month HCC screening  -- continue d/c of ETOH    2. ELEVATED FERRITIN  -- negative HHDNA, suspect due to ETOH    PLAN:  1. Labs today  2. F/u TBD  3. Continue ETOH cessation    Thank you for allowing me to participate in the care of Lance Axeledmund Busby PA-C

## 2019-07-10 ENCOUNTER — PROCEDURE VISIT (OUTPATIENT)
Dept: HEPATOLOGY | Facility: CLINIC | Age: 44
End: 2019-07-10
Attending: PHYSICIAN ASSISTANT
Payer: COMMERCIAL

## 2019-07-10 ENCOUNTER — OFFICE VISIT (OUTPATIENT)
Dept: HEPATOLOGY | Facility: CLINIC | Age: 44
End: 2019-07-10
Payer: COMMERCIAL

## 2019-07-10 ENCOUNTER — LAB VISIT (OUTPATIENT)
Dept: LAB | Facility: HOSPITAL | Age: 44
End: 2019-07-10
Payer: COMMERCIAL

## 2019-07-10 VITALS
HEART RATE: 76 BPM | OXYGEN SATURATION: 96 % | BODY MASS INDEX: 25.27 KG/M2 | DIASTOLIC BLOOD PRESSURE: 82 MMHG | WEIGHT: 148 LBS | HEIGHT: 64 IN | SYSTOLIC BLOOD PRESSURE: 122 MMHG

## 2019-07-10 DIAGNOSIS — R74.8 ELEVATED LIVER ENZYMES: ICD-10-CM

## 2019-07-10 DIAGNOSIS — R74.8 ELEVATED LIVER ENZYMES: Primary | ICD-10-CM

## 2019-07-10 DIAGNOSIS — K74.00 LIVER FIBROSIS: ICD-10-CM

## 2019-07-10 LAB
AFP SERPL-MCNC: 2.9 NG/ML (ref 0–8.4)
ALBUMIN SERPL BCP-MCNC: 3.8 G/DL (ref 3.5–5.2)
ALP SERPL-CCNC: 77 U/L (ref 55–135)
ALT SERPL W/O P-5'-P-CCNC: 52 U/L (ref 10–44)
ANION GAP SERPL CALC-SCNC: 8 MMOL/L (ref 8–16)
AST SERPL-CCNC: 35 U/L (ref 10–40)
BASOPHILS # BLD AUTO: 0.05 K/UL (ref 0–0.2)
BASOPHILS NFR BLD: 0.8 % (ref 0–1.9)
BILIRUB SERPL-MCNC: 0.5 MG/DL (ref 0.1–1)
BUN SERPL-MCNC: 9 MG/DL (ref 6–20)
CALCIUM SERPL-MCNC: 9.6 MG/DL (ref 8.7–10.5)
CHLORIDE SERPL-SCNC: 105 MMOL/L (ref 95–110)
CO2 SERPL-SCNC: 26 MMOL/L (ref 23–29)
CREAT SERPL-MCNC: 0.8 MG/DL (ref 0.5–1.4)
DIFFERENTIAL METHOD: ABNORMAL
EOSINOPHIL # BLD AUTO: 0.5 K/UL (ref 0–0.5)
EOSINOPHIL NFR BLD: 8.2 % (ref 0–8)
ERYTHROCYTE [DISTWIDTH] IN BLOOD BY AUTOMATED COUNT: 12.4 % (ref 11.5–14.5)
EST. GFR  (AFRICAN AMERICAN): >60 ML/MIN/1.73 M^2
EST. GFR  (NON AFRICAN AMERICAN): >60 ML/MIN/1.73 M^2
GLUCOSE SERPL-MCNC: 88 MG/DL (ref 70–110)
HCT VFR BLD AUTO: 48.5 % (ref 40–54)
HGB BLD-MCNC: 15.8 G/DL (ref 14–18)
IMM GRANULOCYTES # BLD AUTO: 0.03 K/UL (ref 0–0.04)
IMM GRANULOCYTES NFR BLD AUTO: 0.5 % (ref 0–0.5)
INR PPP: 1 (ref 0.8–1.2)
LYMPHOCYTES # BLD AUTO: 1.8 K/UL (ref 1–4.8)
LYMPHOCYTES NFR BLD: 27.2 % (ref 18–48)
MCH RBC QN AUTO: 32.6 PG (ref 27–31)
MCHC RBC AUTO-ENTMCNC: 32.6 G/DL (ref 32–36)
MCV RBC AUTO: 100 FL (ref 82–98)
MONOCYTES # BLD AUTO: 0.7 K/UL (ref 0.3–1)
MONOCYTES NFR BLD: 10.6 % (ref 4–15)
NEUTROPHILS # BLD AUTO: 3.5 K/UL (ref 1.8–7.7)
NEUTROPHILS NFR BLD: 52.7 % (ref 38–73)
NRBC BLD-RTO: 0 /100 WBC
PLATELET # BLD AUTO: 255 K/UL (ref 150–350)
PMV BLD AUTO: 10.7 FL (ref 9.2–12.9)
POTASSIUM SERPL-SCNC: 4.3 MMOL/L (ref 3.5–5.1)
PROT SERPL-MCNC: 7.3 G/DL (ref 6–8.4)
PROTHROMBIN TIME: 10.5 SEC (ref 9–12.5)
RBC # BLD AUTO: 4.84 M/UL (ref 4.6–6.2)
SODIUM SERPL-SCNC: 139 MMOL/L (ref 136–145)
WBC # BLD AUTO: 6.62 K/UL (ref 3.9–12.7)

## 2019-07-10 PROCEDURE — 80321 ALCOHOLS BIOMARKERS 1OR 2: CPT

## 2019-07-10 PROCEDURE — 99214 PR OFFICE/OUTPT VISIT, EST, LEVL IV, 30-39 MIN: ICD-10-PCS | Mod: S$GLB,,, | Performed by: PHYSICIAN ASSISTANT

## 2019-07-10 PROCEDURE — 99999 PR PBB SHADOW E&M-EST. PATIENT-LVL III: CPT | Mod: PBBFAC,,, | Performed by: PHYSICIAN ASSISTANT

## 2019-07-10 PROCEDURE — 85610 PROTHROMBIN TIME: CPT

## 2019-07-10 PROCEDURE — 99214 OFFICE O/P EST MOD 30 MIN: CPT | Mod: S$GLB,,, | Performed by: PHYSICIAN ASSISTANT

## 2019-07-10 PROCEDURE — 91200 LIVER ELASTOGRAPHY: CPT | Mod: S$GLB,,, | Performed by: PHYSICIAN ASSISTANT

## 2019-07-10 PROCEDURE — 99999 PR PBB SHADOW E&M-EST. PATIENT-LVL III: ICD-10-PCS | Mod: PBBFAC,,, | Performed by: PHYSICIAN ASSISTANT

## 2019-07-10 PROCEDURE — 3008F PR BODY MASS INDEX (BMI) DOCUMENTED: ICD-10-PCS | Mod: CPTII,S$GLB,, | Performed by: PHYSICIAN ASSISTANT

## 2019-07-10 PROCEDURE — 36415 COLL VENOUS BLD VENIPUNCTURE: CPT

## 2019-07-10 PROCEDURE — 85025 COMPLETE CBC W/AUTO DIFF WBC: CPT

## 2019-07-10 PROCEDURE — 3008F BODY MASS INDEX DOCD: CPT | Mod: CPTII,S$GLB,, | Performed by: PHYSICIAN ASSISTANT

## 2019-07-10 PROCEDURE — 91200 PR LIVER ELASTOGRAPHY W/OUT IMAG W/INTERP & REPORT: ICD-10-PCS | Mod: S$GLB,,, | Performed by: PHYSICIAN ASSISTANT

## 2019-07-10 PROCEDURE — 82105 ALPHA-FETOPROTEIN SERUM: CPT

## 2019-07-10 PROCEDURE — 80053 COMPREHEN METABOLIC PANEL: CPT

## 2019-07-12 NOTE — PROCEDURES
Procedures   Vibration-controlled Transient Elastography Procedure (Fibroscan)    Name: Lance Hampton  Date of Procedure : 2019   :: Jose L Busby PA-C  Diagnosis: Alcohol    Probe: M    Findings  Median liver stiffness score: 11.5 KPa  CAP readin dB/m    IQR/med: 3 %    Interpretation  Fibrosis interpretation is based on medial liver stiffness - Kilopascal (kPa).     Fibrosis stage: F3     Steatosis interpretation is based on controlled attenuation parameter - (dB/m).    Steatosis grade: S2       Jose L Busby PA-C  Hepatology/HCV  [facility] [place] [facility]

## 2019-07-16 LAB — PHOSPHATIDYLETHANOL (PETH): 339 NG/ML

## 2019-07-18 ENCOUNTER — PATIENT MESSAGE (OUTPATIENT)
Dept: HEPATOLOGY | Facility: CLINIC | Age: 44
End: 2019-07-18

## 2019-07-18 ENCOUNTER — TELEPHONE (OUTPATIENT)
Dept: HEPATOLOGY | Facility: CLINIC | Age: 44
End: 2019-07-18

## 2019-07-18 DIAGNOSIS — K74.00 LIVER FIBROSIS: Primary | ICD-10-CM

## 2019-07-18 NOTE — TELEPHONE ENCOUNTER
----- Message from Jose L Busby PA-C sent at 7/18/2019  2:27 PM CDT -----  Please schedule U/S and labs same day as visit 12.2019

## 2019-12-02 NOTE — PROGRESS NOTES
HEPATOLOGY CLINIC VISIT NOTE     REFERRING PROVIDER: No ref. provider found    REASON FOR VISIT: elevated liver enzymes     HISTORY: This is a 43 y.o. White male here for follow up of elevated liver enzymes and liver fibrosis, referred by PCP. He has seen hepatology in past and etiology was alcohol and NAFLD. Fibrosure at the time suggested no significant fibrosis at F0-F1.  His serological workup was negative for Florencio's, alpha-1 antitrypsin deficiency, autoimmune etiology, and viral hepatitis. His ferritin level was elevated again normal serum iron and iron sat. HH DNA analysis was negative for both C282Y and H63D genes.     On most recent labs, , . Tbili WNL, albumin 4.5  PLTs 328,     U/S done 04/2014 noting hepatic steatosis, spleen 9.6 cm, pt had recent elastography F2-F3    At last visit with me, underwent serological eval notable for significant hepatic inflammation (, ) and a (+) PETH <1000, testing negative for AIH, viral hepatitis on repeat.     Since last visit, he reports that he d/stefan alcohol 98%    (+) FH of cirrhosis in grandfather. PMH is listed below. H/o daily alcohol use. Pt d/stefan briefly. He states he learned his wife was pregnant and started drinking daily again.      Pt denies signs of hepatic decompensation including: jaundice, dark urine, abdominal distention, hematemesis, melena, slowed mentation.    He underwent fibroscan at last visit- F3.     Pt denies signs of hepatic decompensation including: jaundice, dark urine, abdominal distention, hematemesis, melena, slowed mentation.    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     Past Surgical History:    Procedure Laterality Date    CHOLECYSTECTOMY  2013    TESTICLE SURGERY Left     hydrocelectomy     FAMILY HISTORY: Negative for liver disease  Grandfather with cirrhosis    SOCIAL HISTORY:   Works in sales    Social History     Tobacco Use   Smoking Status Never Smoker   Smokeless Tobacco Current User    Types: Snuff   Tobacco Comment    2 times a month     Social History     Substance and Sexual Activity   Alcohol Use Yes    Alcohol/week: 17.0 - 24.0 standard drinks    Types: 14 - 21 Glasses of wine, 3 Shots of liquor per week    Frequency: 4 or more times a week    Drinks per session: 3 or 4   6-7 days per week, 3 glasses of wine per day on average  Stopped for 4.5 years 12 years ago     Social History     Substance and Sexual Activity   Drug Use No     ROS:   No fever, chills  No chest pain, dyspnea, cough  No abdominal pain,nausea, vomiting  No headaches, visual changes  No lower extremity edema  No depression or anxiety    PHYSICAL EXAM:  Friendly White male, in no acute distress; alert and oriented to person, place and time  VITALS: reviewed  HEENT: Sclerae anicteric.   NECK: Supple  LUNGS: Normal respiratory effort.   ABDOMEN: Flat, soft, nontender.   SKIN: Warm and dry. No jaundice, No obvious rashes.   EXTREMITIES: No lower extremity edema  NEURO/PSYCH: Normal gate. Memory intact. Thought and speech pattern appropriate. Behavior normal. No depression or anxiety noted.    RECENT LABS:  Lab Results   Component Value Date    WBC 6.62 07/10/2019    HGB 15.8 07/10/2019     07/10/2019     Lab Results   Component Value Date    INR 1.0 07/10/2019     Lab Results   Component Value Date    AST 35 07/10/2019    ALT 52 (H) 07/10/2019    BILITOT 0.5 07/10/2019    ALBUMIN 3.8 07/10/2019    ALKPHOS 77 07/10/2019    CREATININE 0.8 07/10/2019    BUN 9 07/10/2019     07/10/2019    K 4.3 07/10/2019    AFP 2.9 07/10/2019     RECENT IMAGING:  US Abdomen Complete with Doppler (xpd)   Order: [id number]    Status:  Final result   Visible to patient:  Yes (Patient Portal) Next appt:  Today at 01:00 PM in Hepatology (Jose L Busby PA-C) Dx:  Elevated LFTs; Elevated ferritin   Details     Reading Physician Reading Date Result Priority   Efrain Velazquez MD 4/2/2014       Narrative     The liver measures 14 cm in length with normal parenchyma and no focal defects.  The common bile duct is not dilated.  The gallbladder is been removed.  Right kidney measures 10 cm, left kidney measures 10.2 cm.  The spleen measures 9.6 cm.  There is an   area of focal fatty sparing in the right lobe.    Doppler study showed forward flow in the portal veins, hepatic veins, superior mesenteric vein, inferior vena cava and hepatic artery.  Celiac artery is normal in expiration.  No ascites or collateral obesity.      Impression      No significant abnormalities seen.  Fatty liver with focal fatty sparing           ASSESSMENT  43 y.o. White male with:  1. ELEVATED LIVER ENZYMESLIVER FIBROSIS  -- suspect underlying elevation due to ETOH  -- updated AI markers and viral serologies, no negative   -- fibroscan  F3, elastography F2-F3 earlier this year, Fibrosure F0-F1  -- may pursue biopsy versus q6 month HCC screening; given continued ETOH use, will continue q6 month HCC screening     2. ELEVATED FERRITIN  -- negative HHDNA, suspect due to ETOH    3. ETOH ABOVE SENSIBLE LIMITS  -- again recommend d/c     PLAN:  1. Await pending labs  2. HCC screening with clinic visit in 6 months    Thank you for allowing me to participate in the care of Lance Axeledmund Busby PA-C

## 2019-12-03 ENCOUNTER — OFFICE VISIT (OUTPATIENT)
Dept: HEPATOLOGY | Facility: CLINIC | Age: 44
End: 2019-12-03
Payer: COMMERCIAL

## 2019-12-03 ENCOUNTER — RESEARCH ENCOUNTER (OUTPATIENT)
Dept: RESEARCH | Facility: HOSPITAL | Age: 44
End: 2019-12-03

## 2019-12-03 ENCOUNTER — HOSPITAL ENCOUNTER (OUTPATIENT)
Dept: RADIOLOGY | Facility: HOSPITAL | Age: 44
Discharge: HOME | End: 2019-12-03
Attending: PHYSICIAN ASSISTANT
Payer: COMMERCIAL

## 2019-12-03 VITALS
OXYGEN SATURATION: 98 % | TEMPERATURE: 98 F | WEIGHT: 154.13 LBS | HEIGHT: 64 IN | DIASTOLIC BLOOD PRESSURE: 89 MMHG | BODY MASS INDEX: 26.31 KG/M2 | SYSTOLIC BLOOD PRESSURE: 128 MMHG | HEART RATE: 85 BPM

## 2019-12-03 DIAGNOSIS — Z00.6 RESEARCH STUDY PATIENT: Primary | ICD-10-CM

## 2019-12-03 DIAGNOSIS — K74.00 LIVER FIBROSIS: Primary | ICD-10-CM

## 2019-12-03 DIAGNOSIS — F10.90 ALCOHOL INTAKE ABOVE RECOMMENDED SENSIBLE LIMITS: ICD-10-CM

## 2019-12-03 DIAGNOSIS — C22.0 HCC (HEPATOCELLULAR CARCINOMA): ICD-10-CM

## 2019-12-03 DIAGNOSIS — R79.89 ELEVATED FERRITIN: ICD-10-CM

## 2019-12-03 DIAGNOSIS — K74.00 LIVER FIBROSIS: ICD-10-CM

## 2019-12-03 PROCEDURE — 99214 PR OFFICE/OUTPT VISIT, EST, LEVL IV, 30-39 MIN: ICD-10-PCS | Mod: S$GLB,,, | Performed by: PHYSICIAN ASSISTANT

## 2019-12-03 PROCEDURE — 76700 US EXAM ABDOM COMPLETE: CPT | Mod: TC

## 2019-12-03 PROCEDURE — 76700 US EXAM ABDOM COMPLETE: CPT | Mod: 26,,, | Performed by: RADIOLOGY

## 2019-12-03 PROCEDURE — 76700 US ABDOMEN COMPLETE: ICD-10-PCS | Mod: 26,,, | Performed by: RADIOLOGY

## 2019-12-03 PROCEDURE — 99999 PR PBB SHADOW E&M-EST. PATIENT-LVL IV: CPT | Mod: PBBFAC,,, | Performed by: PHYSICIAN ASSISTANT

## 2019-12-03 PROCEDURE — 99214 OFFICE O/P EST MOD 30 MIN: CPT | Mod: S$GLB,,, | Performed by: PHYSICIAN ASSISTANT

## 2019-12-03 PROCEDURE — 99999 PR PBB SHADOW E&M-EST. PATIENT-LVL IV: ICD-10-PCS | Mod: PBBFAC,,, | Performed by: PHYSICIAN ASSISTANT

## 2019-12-03 PROCEDURE — 3008F PR BODY MASS INDEX (BMI) DOCUMENTED: ICD-10-PCS | Mod: CPTII,S$GLB,, | Performed by: PHYSICIAN ASSISTANT

## 2019-12-03 PROCEDURE — 3008F BODY MASS INDEX DOCD: CPT | Mod: CPTII,S$GLB,, | Performed by: PHYSICIAN ASSISTANT

## 2019-12-03 NOTE — PROGRESS NOTES
RESEARCH STUDY CONSENT ENCOUNTER  ORGAN TRANSPLANT  [facility] C[facility]IAN CHARLES    Study Title: Role of Tumor-Induced Immune Tolerance in the Patient Response to Locoregional Therapy: Implications in Assessment Risk of Hepatocellular Carcinoma Recurrence Following Liver Transplantation    IRB #: 2016.131.B    IRB Approval Date: 6/8/2016    : Gutierrez Harrison MD  Sub-investigator: Nate Mota, PhD    Patient Number: [id number]**    Patient was scheduled for labs on (date: 12/3/2019).     This study is an observational study to evaluate patients receiving transarterial chemoembolization (TACE) or transarterial radioembolization (TARE) therapy to define the link between tumor-elicited peripheral cell populations, and the risk of hepatocellular carcinoma (HCC) recurrence before orthotopic liver transplantation (OLT). [IRB 2016.131.B] Patient is being consented as a control for this study and participation in this study will end after specimen collection. This patient is NOT undergoing TACE or TACE, and DOES NOT have HCC.     Present for discussion: Present was alone for discussion   Is LAR Consenting for Subject: YES/NO: no    Prior to the Informed Consent (IC) being signed, or any protocol required testing, procedure, or intervention being performed, the following was done or discussed with Lance Hampton:    Purpose of the Study, Qualifications to Participate: YES/NO: yes  Study Design, Schedule and Procedures: YES/NO: yes  Risks, Benefits, Alternative Treatments, Compensation and Costs: YES/NO: yes  Confidentiality and HIPAA Authorization for Release of Medical Records for the research trial/subject's right/study related injury: YES/NO: yes  Study related contact information: YES/NO: yes  Voluntary Participation and Withdrawal from the research trial at any time: YES/NO: yes  Patient has been offered the opportunity to ask questions regarding the study and all questions were answered satisfactorily: YES/NO:  yes  Patient verbalizes understanding of the study/procedures and agrees to participate: YES/NO: yes  CRC and PI contact information given to patient:YES/NO: yes  Verification the ICF was appropriately completed: YES/NO: yes  Signed copy given to patient: YES/NO: yes  Copy in patient's chart and original uploaded to Epic: YES/NO: yes    Research staff present during consent: Melisa Perez and Kashmir Toribio       No study procedures (I.e. specimen collection) were performed before the informed consent was signed.     In accordance with the study protocol, Research Lab orders were placed on 12/3/2019 after the subject completed and signed the ICF. Due to a lab communication error, specimens were not collected. We will plan to collect a specimen at a future lab appointment. At that time, Research Lab orders will be placed.  [facility] LAB VNP: YES/NO: no  [facility] LABTX: YES/NO: no    Melisa Perez  Admin Research- Liver Transplant

## 2020-01-24 DIAGNOSIS — Z00.6 RESEARCH STUDY PATIENT: Primary | ICD-10-CM

## 2020-01-24 DIAGNOSIS — K74.60 HEPATIC CIRRHOSIS, UNSPECIFIED HEPATIC CIRRHOSIS TYPE, UNSPECIFIED WHETHER ASCITES PRESENT: ICD-10-CM

## 2020-06-08 DIAGNOSIS — R74.8 ELEVATED LIVER ENZYMES: Primary | ICD-10-CM

## 2020-06-09 ENCOUNTER — RESEARCH ENCOUNTER (OUTPATIENT)
Dept: RESEARCH | Facility: HOSPITAL | Age: 45
End: 2020-06-09

## 2020-06-09 ENCOUNTER — LAB VISIT (OUTPATIENT)
Dept: LAB | Facility: HOSPITAL | Age: 45
End: 2020-06-09
Attending: INTERNAL MEDICINE
Payer: COMMERCIAL

## 2020-06-09 ENCOUNTER — HOSPITAL ENCOUNTER (OUTPATIENT)
Dept: RADIOLOGY | Facility: HOSPITAL | Age: 45
Discharge: HOME | End: 2020-06-09
Attending: PHYSICIAN ASSISTANT
Payer: COMMERCIAL

## 2020-06-09 ENCOUNTER — OFFICE VISIT (OUTPATIENT)
Dept: HEPATOLOGY | Facility: CLINIC | Age: 45
End: 2020-06-09
Payer: COMMERCIAL

## 2020-06-09 VITALS
HEIGHT: 64 IN | OXYGEN SATURATION: 98 % | DIASTOLIC BLOOD PRESSURE: 84 MMHG | HEART RATE: 64 BPM | BODY MASS INDEX: 27.7 KG/M2 | SYSTOLIC BLOOD PRESSURE: 123 MMHG | WEIGHT: 162.25 LBS

## 2020-06-09 DIAGNOSIS — K74.00 LIVER FIBROSIS: ICD-10-CM

## 2020-06-09 DIAGNOSIS — K70.0 ALCOHOL INDUCED FATTY LIVER: ICD-10-CM

## 2020-06-09 DIAGNOSIS — K74.60 HEPATIC CIRRHOSIS, UNSPECIFIED HEPATIC CIRRHOSIS TYPE, UNSPECIFIED WHETHER ASCITES PRESENT: ICD-10-CM

## 2020-06-09 DIAGNOSIS — R74.8 ELEVATED LIVER ENZYMES: ICD-10-CM

## 2020-06-09 DIAGNOSIS — K74.00 LIVER FIBROSIS: Primary | ICD-10-CM

## 2020-06-09 DIAGNOSIS — Z00.6 RESEARCH STUDY PATIENT: ICD-10-CM

## 2020-06-09 PROBLEM — K76.0 HEPATIC STEATOSIS: Status: ACTIVE | Noted: 2020-06-09

## 2020-06-09 LAB
AFP SERPL-MCNC: 2.7 NG/ML (ref 0–8.4)
ALBUMIN SERPL BCP-MCNC: 4.1 G/DL (ref 3.5–5.2)
ALP SERPL-CCNC: 108 U/L (ref 55–135)
ALT SERPL W/O P-5'-P-CCNC: 60 U/L (ref 10–44)
ANION GAP SERPL CALC-SCNC: 8 MMOL/L (ref 8–16)
AST SERPL-CCNC: 45 U/L (ref 10–40)
BASOPHILS # BLD AUTO: 0.02 K/UL (ref 0–0.2)
BASOPHILS NFR BLD: 0.3 % (ref 0–1.9)
BILIRUB SERPL-MCNC: 0.8 MG/DL (ref 0.1–1)
BUN SERPL-MCNC: 12 MG/DL (ref 6–20)
CALCIUM SERPL-MCNC: 9.8 MG/DL (ref 8.7–10.5)
CHLORIDE SERPL-SCNC: 102 MMOL/L (ref 95–110)
CO2 SERPL-SCNC: 28 MMOL/L (ref 23–29)
CREAT SERPL-MCNC: 0.8 MG/DL (ref 0.5–1.4)
DIFFERENTIAL METHOD: ABNORMAL
EOSINOPHIL # BLD AUTO: 0.5 K/UL (ref 0–0.5)
EOSINOPHIL NFR BLD: 7.6 % (ref 0–8)
ERYTHROCYTE [DISTWIDTH] IN BLOOD BY AUTOMATED COUNT: 13.2 % (ref 11.5–14.5)
EST. GFR  (AFRICAN AMERICAN): >60 ML/MIN/1.73 M^2
EST. GFR  (NON AFRICAN AMERICAN): >60 ML/MIN/1.73 M^2
GLUCOSE SERPL-MCNC: 93 MG/DL (ref 70–110)
HCT VFR BLD AUTO: 50.1 % (ref 40–54)
HGB BLD-MCNC: 16.3 G/DL (ref 14–18)
IMM GRANULOCYTES # BLD AUTO: 0.02 K/UL (ref 0–0.04)
IMM GRANULOCYTES NFR BLD AUTO: 0.3 % (ref 0–0.5)
INR PPP: 1.1 (ref 0.8–1.2)
LYMPHOCYTES # BLD AUTO: 1.5 K/UL (ref 1–4.8)
LYMPHOCYTES NFR BLD: 24.7 % (ref 18–48)
MCH RBC QN AUTO: 31.3 PG (ref 27–31)
MCHC RBC AUTO-ENTMCNC: 32.5 G/DL (ref 32–36)
MCV RBC AUTO: 96 FL (ref 82–98)
MONOCYTES # BLD AUTO: 0.7 K/UL (ref 0.3–1)
MONOCYTES NFR BLD: 12.3 % (ref 4–15)
NEUTROPHILS # BLD AUTO: 3.2 K/UL (ref 1.8–7.7)
NEUTROPHILS NFR BLD: 54.8 % (ref 38–73)
NRBC BLD-RTO: 0 /100 WBC
PLATELET # BLD AUTO: 276 K/UL (ref 150–350)
PMV BLD AUTO: 10.3 FL (ref 9.2–12.9)
POTASSIUM SERPL-SCNC: 4.7 MMOL/L (ref 3.5–5.1)
PROT SERPL-MCNC: 7.5 G/DL (ref 6–8.4)
PROTHROMBIN TIME: 11 SEC (ref 9–12.5)
RBC # BLD AUTO: 5.2 M/UL (ref 4.6–6.2)
SODIUM SERPL-SCNC: 138 MMOL/L (ref 136–145)
WBC # BLD AUTO: 5.92 K/UL (ref 3.9–12.7)

## 2020-06-09 PROCEDURE — 36415 COLL VENOUS BLD VENIPUNCTURE: CPT

## 2020-06-09 PROCEDURE — 76700 US ABDOMEN COMPLETE: ICD-10-PCS | Mod: 26,,, | Performed by: RADIOLOGY

## 2020-06-09 PROCEDURE — 3008F BODY MASS INDEX DOCD: CPT | Mod: CPTII,S$GLB,, | Performed by: NURSE PRACTITIONER

## 2020-06-09 PROCEDURE — 80053 COMPREHEN METABOLIC PANEL: CPT

## 2020-06-09 PROCEDURE — 80321 ALCOHOLS BIOMARKERS 1OR 2: CPT

## 2020-06-09 PROCEDURE — 85025 COMPLETE CBC W/AUTO DIFF WBC: CPT

## 2020-06-09 PROCEDURE — 76700 US EXAM ABDOM COMPLETE: CPT | Mod: 26,,, | Performed by: RADIOLOGY

## 2020-06-09 PROCEDURE — 3008F PR BODY MASS INDEX (BMI) DOCUMENTED: ICD-10-PCS | Mod: CPTII,S$GLB,, | Performed by: NURSE PRACTITIONER

## 2020-06-09 PROCEDURE — 99999 PR PBB SHADOW E&M-EST. PATIENT-LVL IV: ICD-10-PCS | Mod: PBBFAC,,, | Performed by: NURSE PRACTITIONER

## 2020-06-09 PROCEDURE — 99214 PR OFFICE/OUTPT VISIT, EST, LEVL IV, 30-39 MIN: ICD-10-PCS | Mod: S$GLB,,, | Performed by: NURSE PRACTITIONER

## 2020-06-09 PROCEDURE — 99999 PR PBB SHADOW E&M-EST. PATIENT-LVL IV: CPT | Mod: PBBFAC,,, | Performed by: NURSE PRACTITIONER

## 2020-06-09 PROCEDURE — 85610 PROTHROMBIN TIME: CPT

## 2020-06-09 PROCEDURE — 76700 US EXAM ABDOM COMPLETE: CPT | Mod: TC

## 2020-06-09 PROCEDURE — 82105 ALPHA-FETOPROTEIN SERUM: CPT

## 2020-06-09 PROCEDURE — 99214 OFFICE O/P EST MOD 30 MIN: CPT | Mod: S$GLB,,, | Performed by: NURSE PRACTITIONER

## 2020-06-09 RX ORDER — CETIRIZINE HYDROCHLORIDE 5 MG/1
5 TABLET ORAL DAILY
COMMUNITY
End: 2022-04-12

## 2020-06-09 NOTE — PATIENT INSTRUCTIONS
1. Repeat labs and imaging in 6 months to monitor liver function and screen for liver cancer.   2. Recommend that you abstain from all alcohol, given evidence of more advanced fibrosis (scarring) on recent Fibroscan in July 2019.   3. Repeat Fibroscan at next visit - please 3-4 hours prior to exam.   4. There is no FDA approved therapy for non-alcoholic fatty liver disease. Therefore, these things are important:  - Low carb/sugar, high fiber and protein diet.Try to limit your carb intake to LESS than 30-45 grams of carbs with a meal or LESS than 5-10 grams with any snack (total of any snack foods eaten during that time). Use MyFitness Pal chelita to add up your carbs through the day. Do NOT drink any beverages with calories or carbs (this can lead to high blood sugar and weight gain). Also, some of our patients have been very successful with weight loss using the pre made/planned meal planning services that limit calories and portion size (one example is Sensible Meals but there are many out there)  - Exercise, 5 days per week, 30 minutes per day, as tolerated.  - Recommend good cholesterol, blood pressure, blood sugar levels.     In some people, fatty liver can progress to steatohepatitis (inflamatory fatty liver) and possibly to cirrhosis, putting one at increased risk for liver cancer, liver failure, and death. Therefore, the lifestyle changes are very important to decrease this risk.     Website with information about fatty liver and inflammation related to fatty liver (CROW) = www.nashtruth.com  AND www.NASHactually.com

## 2020-06-09 NOTE — PROGRESS NOTES
RESEARCH STUDY CONSENT ENCOUNTER  ORGAN TRANSPLANT  [facility] CRISTIAN CHARLES    Study Title: Role of Tumor-Induced Immune Tolerance in the Patient Response to Locoregional Therapy: Implications in Assessment Risk of Hepatocellular Carcinoma Recurrence Following Liver Transplantation    IRB #: 2016.131.B    IRB Approval Date: 6/8/2016    : Gutierrez Harrison MD  Sub-investigator: Nate Mota, PhD    Patient Number: [id number]**    Patient was scheduled for labs on (date: 6/9/2020).   In accordance with the study protocol, Research Lab orders were placed but specimens were NOT collected on (date: 6/9/2020) in:  [facility] LAB VNP: YES/NO: no  [facility] LABTX: YES/NO: no  [facility] LAB IM: YES/NO: no     Lab orders were placed and not canceled by mistake. No research labs were collected on 6/9/2020.     Melisa Perez   Admin Research- Liver Transplant

## 2020-06-09 NOTE — PROGRESS NOTES
I have personally performed a face to face diagnostic evaluation on this patient. I have reviewed and agree with today's findings and the care plan outlined by Estefany López NP.     He will return to Estefany López NP for follow-up in 6 months with repeat labs, imaging and Fibroscan.

## 2020-06-09 NOTE — PROGRESS NOTES
HEPATOLOGY CLINIC VISIT NOTE     REFERRING PROVIDER: Dr. Arthur Vidal Jr.    REASON FOR VISIT: elevated liver enzymes     HISTORY: Mr. Hampton is a 44 y.o. White male here for follow up in the management of elevated liver enzymes with advanced fibrosis, secondary to alcohol and fatty liver disease. He was last seen in clinic in 12/2019 by Jose L Busby PA-C. Labs at that time showed normal synthetic function, but AST/ALT were significantly elevated in the 200's.  His ferritin level has also been elevated in the past, but he has had persistently normal serum iron and iron sat, so the etiology is most likely related to fatty liver disease and alcohol misuse. Additionally, HH DNA analysis was negative for both C282Y and H63D genes, and the remainder of his serological work up was negative.   Since the clinic visit in December, he discontinued alcohol usage for 2 months, but resumed drinking after self quarantine began in March. Current reported alcohol use is 1-2 glasses of wine most days of the week. Last Fibroscan in 7/2019 was consistent with F3 fibrosis, however, prior Fibrosure testing revealed F0-F1. His ferritin level has been elevated, but he has had persistently normal serum iron and iron sat. HH DNA analysis was negative for both C282Y and H63D genes, and the remainder of his serological work up was negative.   Today in clinic he has no acute complaints and is well appearing. He denies any signs or symptoms of advanced liver disease, including jaundice, dark urine, nausea, vomiting, abdominal pain or distention, lower extremity edema, melena or hematochezia or periods of confusion suggestive of hepatic encephalopathy.     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     Past Surgical History:   Procedure Laterality Date     CHOLECYSTECTOMY  2013    TESTICLE SURGERY Left     hydrocelectomy     FAMILY HISTORY: Negative for liver disease  Grandfather with cirrhosis    SOCIAL HISTORY:   Works in sales    Social History     Tobacco Use   Smoking Status Never Smoker   Smokeless Tobacco Current User    Types: Snuff   Tobacco Comment    2 times a month     Social History     Substance and Sexual Activity   Alcohol Use Yes    Alcohol/week: 10.0 - 12.0 standard drinks    Types: 10 - 12 Glasses of wine per week    Frequency: 4 or more times a week    Drinks per session: 1 or 2   Stopped for 4.5 years 12 years ago     Social History     Substance and Sexual Activity   Drug Use No     ROS:   No fever, chills  No chest pain, dyspnea, cough  No abdominal pain,nausea, vomiting  No headaches, visual changes  No lower extremity edema  No depression or anxiety    PHYSICAL EXAM:  Friendly White male, in no acute distress; alert and oriented to person, place and time  VITALS: reviewed  HEENT: Sclerae anicteric.   NECK: Supple  LUNGS: Normal respiratory effort.   ABDOMEN: Flat, soft, nontender.   SKIN: Warm and dry. No jaundice, No obvious rashes.   EXTREMITIES: No lower extremity edema  NEURO/PSYCH: Normal gate. Memory intact. Thought and speech pattern appropriate. Behavior normal. No depression or anxiety noted.    RECENT LABS:  Lab Results   Component Value Date    WBC 5.92 06/09/2020    HGB 16.3 06/09/2020     06/09/2020     Lab Results   Component Value Date    INR 1.0 12/03/2019     Lab Results   Component Value Date     (H) 12/03/2019     (H) 12/03/2019    BILITOT 0.9 12/03/2019    ALBUMIN 4.7 12/03/2019    ALKPHOS 110 12/03/2019    CREATININE 1.0 12/03/2019    BUN 8 12/03/2019     12/03/2019    K 4.5 12/03/2019    AFP 3.4 12/03/2019     RECENT IMAGING:    US Abdomen Complete 6/9/2020:    FINDINGS:  Pancreas: The visualized portions of pancreas appear normal.    Aorta: No aneurysm.    Liver: 14.0 cm, normal in size.  Diffusely increased parenchymal echogenicity consistent with steatosis. No focal lesions.    Gallbladder: The gallbladder is surgically absent.    Biliary system: 4 mm common bile duct.  No intrahepatic ductal dilatation.    Inferior vena cava: Normal in appearance.    Right kidney: 11 cm. No hydronephrosis.    Left kidney: 10.1 cm. No hydronephrosis.    Spleen: 10.1 x 2.6 cm.  Normal in size with homogeneous echotexture.    Miscellaneous: No ascites.      Impression       Hepatic steatosis.     US Abdomen Complete 12/3/2019:    FINDINGS:  Pancreas: The visualized portions of pancreas appear normal.    Aorta: No aneurysm.    Liver: 14.5 cm, normal in size. Diffusely increased parenchymal echogenicity consistent with steatosis. No focal lesions.    Gallbladder: No calculi, wall thickening, or pericholecystic fluid.  Negative sonographic Turner's sign.    Biliary system: 5 mm common bile duct.  No intrahepatic ductal dilatation.    Inferior vena cava: Normal in appearance.    Right kidney: 10 cm. No hydronephrosis.    Left kidney: 10.1 cm. No hydronephrosis.    Spleen: 9 x 2 cm.  Normal in size with homogeneous echotexture.    Miscellaneous: No ascites.      Impression       Hepatic steatosis       ASSESSMENT  44 y.o. White male with a history of elevated liver enzymes, secondary to heavy alcohol intake, and fatty liver disease. Most recent Fibroscan in July was consistent with F3 fibrosis. Of note, he has a family history of cirrhosis, most likely secondary to alcoholic liver disease.  Since his last visit in December, he has reduced alcohol consumption, with current reported use of 1-2 glasses of wine 5-6 days per week. USN today showed hepatic steatosis with no focal lesions. Reassuringly, his liver enzymes have significantly improved with alcohol cessation/decreased consumption, and his synthetic function remains normal. However, due to elevated Fibroscan last year, we will continue to proceed with HCC  surveillance every 6 months for the time being, until we can verify regression in fibrosis through Fibroscan or liver biopsy.       PLAN:    1. Repeat labs and imaging in 6 months to monitor liver function and screen for liver cancer (orders placed in Epic).   2. Recommend complete abstinence from alcohol.  3. Repeat Fibroscan at next visit - please 3-4 hours prior to exam.   4. Recommend Low carb/sugar, high fiber and protein diet.Try to limit your carb intake to LESS than 30-45 grams of carbs with a meal or LESS than 5-10 grams with any snack.  5. Recommend Exercise, 5 days per week, 30 minutes per day, as tolerated.  6. Hyperlipidemia management per PCP, Dr. Vidal.    PRIYA Perkins  Hepatology Nurse Practitioner  [facility].  6/9/2020 @ 1300

## 2020-06-15 LAB — PHOSPHATIDYLETHANOL (PETH): 901 NG/ML

## 2020-06-22 ENCOUNTER — OFFICE VISIT (OUTPATIENT)
Dept: URGENT CARE | Facility: CLINIC | Age: 45
End: 2020-06-22
Payer: COMMERCIAL

## 2020-06-22 VITALS — TEMPERATURE: 98 F | OXYGEN SATURATION: 98 % | HEART RATE: 70 BPM

## 2020-06-22 DIAGNOSIS — Z11.9 ENCOUNTER FOR SCREENING EXAMINATION FOR INFECTIOUS DISEASE: Primary | ICD-10-CM

## 2020-06-22 PROCEDURE — 99214 PR OFFICE/OUTPT VISIT, EST, LEVL IV, 30-39 MIN: ICD-10-PCS | Mod: S$GLB,,, | Performed by: FAMILY MEDICINE

## 2020-06-22 PROCEDURE — 99214 OFFICE O/P EST MOD 30 MIN: CPT | Mod: S$GLB,,, | Performed by: FAMILY MEDICINE

## 2020-06-22 PROCEDURE — U0003 INFECTIOUS AGENT DETECTION BY NUCLEIC ACID (DNA OR RNA); SEVERE ACUTE RESPIRATORY SYNDROME CORONAVIRUS 2 (SARS-COV-2) (CORONAVIRUS DISEASE [COVID-19]), AMPLIFIED PROBE TECHNIQUE, MAKING USE OF HIGH THROUGHPUT TECHNOLOGIES AS DESCRIBED BY CMS-2020-01-R: HCPCS

## 2020-06-22 NOTE — PROGRESS NOTES
asx just exposure    Counseled patient and answered questions in regards to COVID-19 testing and diagnosis

## 2020-06-22 NOTE — PATIENT INSTRUCTIONS
"COVID TESTING      What does the test tell me?  The test will tell you if you currently have COVID-19.      What is the turnaround time of the antibody test?  The antibody test results are usually provided within 24-36 hours of collection, depending on the testing  Location.      How will I get my results?  We will call you from the clinic as soon as we get your results. Results will also sent to your [facility]   patient portal where you can view them within 24 hours. You can download the "[employer]" ronaldo in your smart phone's Ronaldo store.  You can also visit  [email or web address]  to set up your portal. You may contact [email or web address] or   [facility] Patient Support Line at [phone] for assistance.    "

## 2020-06-24 LAB — SARS-COV-2 RNA RESP QL NAA+PROBE: NOT DETECTED

## 2020-07-19 PROBLEM — K70.10 ALCOHOLIC STEATOHEPATITIS: Status: RESOLVED | Noted: 2019-04-17 | Resolved: 2020-07-19

## 2020-07-19 NOTE — PROGRESS NOTES
Subjective:       Patient ID: Lance Hampton is a 44 y.o. male who returns for only his second visit with me in nearly 5 years.    Chief Complaint: Annual Exam    HPI    He presents to the office today requesting a routine periodic health examination.  He is also followed by our Hepatology Department, for alcohol induced liver fibrosis.    Patient Active Problem List   Diagnosis    Gastroesophageal reflux disease without esophagitis    AR (allergic rhinitis)    Hyperlipidemia    Biceps tendonitis on left    Alcohol intake above recommended sensible limits    Liver fibrosis    Alcohol induced fatty liver       Past Surgical History:   Procedure Laterality Date    CHOLECYSTECTOMY  2013    TESTICLE SURGERY Left     hydrocelectomy         Current Outpatient Medications:     cetirizine (ZYRTEC) 5 MG tablet, Take 5 mg by mouth once daily., Disp: , Rfl:     Review of patient's allergies indicates:  No Known Allergies    Family History   Problem Relation Age of Onset    Alcohol abuse Father     Cancer Maternal Grandfather         prostate cancer    Alcohol abuse Paternal Grandfather     Cirrhosis Paternal Grandfather        Social History     Socioeconomic History    Marital status:      Spouse name: Monica     Number of children: 1    Years of education: Not on file    Highest education level: Not on file   Occupational History    Occupation: Sales   Social Needs    Financial resource strain: Not hard at all    Food insecurity     Worry: Never true     Inability: Never true    Transportation needs     Medical: No     Non-medical: No   Tobacco Use    Smoking status: Never Smoker    Smokeless tobacco: Current User     Types: Snuff    Tobacco comment: 2 times a month   Substance and Sexual Activity    Alcohol use: Yes     Alcohol/week: 10.0 - 12.0 standard drinks     Types: 10 - 12 Glasses of wine per week     Frequency: 4 or more times a week     Drinks per session: 1 or 2     Binge  frequency: Weekly    Drug use: No    Sexual activity: Yes     Partners: Female   Lifestyle    Physical activity     Days per week: 0 days     Minutes per session: Patient refused    Stress: Only a little   Relationships    Social connections     Talks on phone: More than three times a week     Gets together: More than three times a week     Attends Jew service: Never     Active member of club or organization: No     Attends meetings of clubs or organizations: Never     Relationship status:    Other Topics Concern    Not on file   Social History Narrative    The patient does not exercise regularly ().      Rates diet as fair.      He is not satisfied with weight.    He does drink at least 1/2 gallon water daily.    He drinks 1 coffee/tea/caffeine-containing soft drinks daily.    Total sleep time at night is 8 hours.    He works 40-50 hours per week.    He does wear seat belts.    Hobbies include golf, foodie.               Patient Care Team:  Arthur Vidal Jr., MD as PCP - General (Family Medicine)  Barbara Carr MA as Care Coordinator  [place] NP as Nurse Practitioner (Transplant)    Health Maintenance   Topic Date Due    Pneumococcal Vaccine (Highest Risk) (1 of 3 - PCV13) 12/08/1995    Lipid Panel  04/16/2024    TETANUS VACCINE  12/23/2024        Review of Systems   Constitutional: Negative for activity change, fatigue and unexpected weight change.   HENT: Negative for ear discharge, ear pain, hearing loss, rhinorrhea, tinnitus, trouble swallowing and voice change.    Eyes: Negative for pain, discharge and visual disturbance.   Respiratory: Negative for cough, chest tightness, shortness of breath and wheezing.    Cardiovascular: Negative for chest pain, palpitations and leg swelling.   Gastrointestinal: Negative for abdominal pain, blood in stool, constipation, diarrhea, nausea and vomiting.   Endocrine: Negative for polydipsia and polyuria.   Genitourinary: Negative for  "decreased urine volume, difficulty urinating, dysuria, enuresis, frequency, hematuria and urgency.   Musculoskeletal: Negative for arthralgias, back pain, joint swelling, myalgias and neck pain.   Skin: Negative for rash.   Neurological: Negative for dizziness, weakness, light-headedness and headaches.   Hematological: Does not bruise/bleed easily.   Psychiatric/Behavioral: Negative for confusion, dysphoric mood and sleep disturbance. The patient is not nervous/anxious.          Objective:      /88   Pulse 85   Ht 5' 4" (1.626 m)   Wt 73.9 kg (163 lb)   SpO2 98%   BMI 27.98 kg/m²     Physical Exam  Vitals signs reviewed.   Constitutional:       Appearance: He is well-developed.   HENT:      Head: Normocephalic and atraumatic.      Right Ear: External ear normal.      Left Ear: External ear normal.      Nose: Nose normal.      Mouth/Throat:      Pharynx: No oropharyngeal exudate.   Eyes:      General: No scleral icterus.     Conjunctiva/sclera: Conjunctivae normal.   Neck:      Musculoskeletal: Neck supple.      Thyroid: No thyromegaly.      Vascular: No carotid bruit or JVD.   Cardiovascular:      Rate and Rhythm: Normal rate and regular rhythm.      Pulses: Normal pulses.      Heart sounds: Normal heart sounds. No murmur. No friction rub. No gallop.    Pulmonary:      Effort: Pulmonary effort is normal.      Breath sounds: Normal breath sounds. No wheezing, rhonchi or rales.   Abdominal:      General: Bowel sounds are normal. There is no distension.      Palpations: Abdomen is soft. There is no hepatomegaly, splenomegaly or mass.      Tenderness: There is no abdominal tenderness.   Musculoskeletal: Normal range of motion.         General: No tenderness.   Lymphadenopathy:      Cervical: No cervical adenopathy.   Skin:     General: Skin is warm and dry.   Neurological:      Mental Status: He is alert and oriented to person, place, and time.      Cranial Nerves: No cranial nerve deficit.      Sensory: No " "sensory deficit.      Coordination: Coordination normal.      Deep Tendon Reflexes: Reflexes are normal and symmetric.   Psychiatric:         Behavior: Behavior is cooperative.         Assessment:       1. Routine general medical examination at a health care facility    2. Hyperlipidemia, unspecified hyperlipidemia type    3. Alcohol induced fatty liver    4. Liver fibrosis    5. Gastroesophageal reflux disease without esophagitis        Plan:       Discussed HIV screening.  Offered pneumococcal vaccine.    Labs (see Orders)     Orders Placed This Encounter    Pneumococcal Conjugate Vaccine (13 Valent) (IM)    Lipid Panel     We will call the patient with results & make further recommendations at that time.              "This note will not be shared with the patient."    "

## 2020-07-21 ENCOUNTER — OFFICE VISIT (OUTPATIENT)
Dept: FAMILY MEDICINE | Facility: CLINIC | Age: 45
End: 2020-07-21
Attending: FAMILY MEDICINE
Payer: COMMERCIAL

## 2020-07-21 VITALS
OXYGEN SATURATION: 98 % | SYSTOLIC BLOOD PRESSURE: 120 MMHG | HEIGHT: 64 IN | HEART RATE: 85 BPM | WEIGHT: 163 LBS | BODY MASS INDEX: 27.83 KG/M2 | DIASTOLIC BLOOD PRESSURE: 88 MMHG

## 2020-07-21 DIAGNOSIS — K21.9 GASTROESOPHAGEAL REFLUX DISEASE WITHOUT ESOPHAGITIS: ICD-10-CM

## 2020-07-21 DIAGNOSIS — Z00.00 ROUTINE GENERAL MEDICAL EXAMINATION AT A HEALTH CARE FACILITY: Primary | ICD-10-CM

## 2020-07-21 DIAGNOSIS — K74.00 LIVER FIBROSIS: ICD-10-CM

## 2020-07-21 DIAGNOSIS — K70.0 ALCOHOL INDUCED FATTY LIVER: ICD-10-CM

## 2020-07-21 DIAGNOSIS — E78.5 HYPERLIPIDEMIA, UNSPECIFIED HYPERLIPIDEMIA TYPE: ICD-10-CM

## 2020-07-21 PROCEDURE — 99999 PR PBB SHADOW E&M-EST. PATIENT-LVL III: ICD-10-PCS | Mod: PBBFAC,,, | Performed by: FAMILY MEDICINE

## 2020-07-21 PROCEDURE — 3008F PR BODY MASS INDEX (BMI) DOCUMENTED: ICD-10-PCS | Mod: CPTII,S$GLB,, | Performed by: FAMILY MEDICINE

## 2020-07-21 PROCEDURE — 99999 PR PBB SHADOW E&M-EST. PATIENT-LVL III: CPT | Mod: PBBFAC,,, | Performed by: FAMILY MEDICINE

## 2020-07-21 PROCEDURE — 3008F BODY MASS INDEX DOCD: CPT | Mod: CPTII,S$GLB,, | Performed by: FAMILY MEDICINE

## 2020-07-21 PROCEDURE — 99396 PR PREVENTIVE VISIT,EST,40-64: ICD-10-PCS | Mod: S$GLB,,, | Performed by: FAMILY MEDICINE

## 2020-07-21 PROCEDURE — 99396 PREV VISIT EST AGE 40-64: CPT | Mod: S$GLB,,, | Performed by: FAMILY MEDICINE

## 2020-07-22 ENCOUNTER — LAB VISIT (OUTPATIENT)
Dept: LAB | Facility: HOSPITAL | Age: 45
End: 2020-07-22
Attending: FAMILY MEDICINE
Payer: COMMERCIAL

## 2020-07-22 DIAGNOSIS — K74.00 LIVER FIBROSIS: ICD-10-CM

## 2020-07-22 DIAGNOSIS — K70.0 ALCOHOL INDUCED FATTY LIVER: ICD-10-CM

## 2020-07-22 DIAGNOSIS — E78.5 HYPERLIPIDEMIA, UNSPECIFIED HYPERLIPIDEMIA TYPE: ICD-10-CM

## 2020-07-22 LAB
CHOLEST SERPL-MCNC: 260 MG/DL (ref 120–199)
CHOLEST/HDLC SERPL: 3.8 {RATIO} (ref 2–5)
HDLC SERPL-MCNC: 68 MG/DL (ref 40–75)
HDLC SERPL: 26.2 % (ref 20–50)
LDLC SERPL CALC-MCNC: 174.4 MG/DL (ref 63–159)
NONHDLC SERPL-MCNC: 192 MG/DL
TRIGL SERPL-MCNC: 88 MG/DL (ref 30–150)

## 2020-07-22 PROCEDURE — 80061 LIPID PANEL: CPT

## 2020-07-22 PROCEDURE — 36415 COLL VENOUS BLD VENIPUNCTURE: CPT | Mod: PO

## 2020-07-23 ENCOUNTER — PATIENT MESSAGE (OUTPATIENT)
Dept: FAMILY MEDICINE | Facility: CLINIC | Age: 45
End: 2020-07-23

## 2020-12-03 ENCOUNTER — PATIENT MESSAGE (OUTPATIENT)
Dept: HEPATOLOGY | Facility: CLINIC | Age: 45
End: 2020-12-03

## 2020-12-08 ENCOUNTER — OFFICE VISIT (OUTPATIENT)
Dept: URGENT CARE | Facility: CLINIC | Age: 45
End: 2020-12-08
Payer: COMMERCIAL

## 2020-12-08 ENCOUNTER — TELEPHONE (OUTPATIENT)
Dept: HEPATOLOGY | Facility: CLINIC | Age: 45
End: 2020-12-08

## 2020-12-08 VITALS
DIASTOLIC BLOOD PRESSURE: 83 MMHG | WEIGHT: 185 LBS | BODY MASS INDEX: 31.58 KG/M2 | HEART RATE: 71 BPM | OXYGEN SATURATION: 99 % | TEMPERATURE: 98 F | HEIGHT: 64 IN | SYSTOLIC BLOOD PRESSURE: 123 MMHG | RESPIRATION RATE: 16 BRPM

## 2020-12-08 DIAGNOSIS — B34.9 VIRAL SYNDROME: Primary | ICD-10-CM

## 2020-12-08 LAB
CTP QC/QA: YES
SARS-COV-2 RDRP RESP QL NAA+PROBE: NEGATIVE

## 2020-12-08 PROCEDURE — 99214 OFFICE O/P EST MOD 30 MIN: CPT | Mod: S$GLB,,, | Performed by: PHYSICIAN ASSISTANT

## 2020-12-08 PROCEDURE — 3008F BODY MASS INDEX DOCD: CPT | Mod: CPTII,S$GLB,, | Performed by: PHYSICIAN ASSISTANT

## 2020-12-08 PROCEDURE — U0002: ICD-10-PCS | Mod: QW,S$GLB,, | Performed by: PHYSICIAN ASSISTANT

## 2020-12-08 PROCEDURE — 3008F PR BODY MASS INDEX (BMI) DOCUMENTED: ICD-10-PCS | Mod: CPTII,S$GLB,, | Performed by: PHYSICIAN ASSISTANT

## 2020-12-08 PROCEDURE — 99214 PR OFFICE/OUTPT VISIT, EST, LEVL IV, 30-39 MIN: ICD-10-PCS | Mod: S$GLB,,, | Performed by: PHYSICIAN ASSISTANT

## 2020-12-08 PROCEDURE — U0002 COVID-19 LAB TEST NON-CDC: HCPCS | Mod: QW,S$GLB,, | Performed by: PHYSICIAN ASSISTANT

## 2020-12-08 NOTE — PROGRESS NOTES
"Subjective:       Patient ID: Lance Hampton is a 45 y.o. male.    Vitals:  height is 5' 4" (1.626 m) and weight is 83.9 kg (185 lb). His temperature is 98.3 °F (36.8 °C). His blood pressure is 123/83 and his pulse is 71. His respiration is 16 and oxygen saturation is 99%.     Chief Complaint: Fever    Pt c/o fever, cough, and sneezing that started yesterday     Fever   This is a new problem. The current episode started yesterday. The problem occurs intermittently. The problem has been gradually worsening. The temperature was taken using an oral thermometer. Associated symptoms include coughing. Pertinent negatives include no congestion, ear pain, nausea, rash, sore throat, vomiting or wheezing. He has tried acetaminophen for the symptoms. The treatment provided mild relief.       Constitution: Positive for fever. Negative for chills, sweating and fatigue.   HENT: Negative for ear pain, congestion, sinus pain, sinus pressure, sore throat and voice change.    Neck: Negative for painful lymph nodes.   Eyes: Negative for eye redness.   Respiratory: Positive for cough. Negative for chest tightness, sputum production, bloody sputum, COPD, shortness of breath, stridor, wheezing and asthma.    Gastrointestinal: Negative for nausea and vomiting.   Musculoskeletal: Negative for muscle ache.   Skin: Negative for rash.   Allergic/Immunologic: Negative for seasonal allergies and asthma.   Hematologic/Lymphatic: Negative for swollen lymph nodes.       Objective:      Physical Exam   Constitutional: He is oriented to person, place, and time. He appears well-developed. He is cooperative. He does not appear ill. No distress.   HENT:   Head: Normocephalic and atraumatic.   Ears:   Right Ear: Hearing, tympanic membrane, external ear and ear canal normal.   Left Ear: Hearing, tympanic membrane, external ear and ear canal normal.   Nose: Mucosal edema present. No rhinorrhea. Right sinus exhibits no maxillary sinus tenderness and " no frontal sinus tenderness. Left sinus exhibits no maxillary sinus tenderness and no frontal sinus tenderness.   Mouth/Throat: Uvula is midline and mucous membranes are normal. Posterior oropharyngeal erythema and cobblestoning present. No oropharyngeal exudate or posterior oropharyngeal edema.   Eyes: Conjunctivae, EOM and lids are normal.   Neck: Trachea normal, full passive range of motion without pain and phonation normal. Neck supple.   Cardiovascular: Regular rhythm and normal heart sounds. Exam reveals no gallop.   No murmur heard.  Pulmonary/Chest: Effort normal and breath sounds normal. No respiratory distress. He has no decreased breath sounds. He has no wheezes. He has no rhonchi. He has no rales.   Musculoskeletal: Normal range of motion.   Neurological: He is alert and oriented to person, place, and time.   Skin: Skin is warm, dry, intact and not diaphoretic. Psychiatric: His speech is normal and behavior is normal. Judgment and thought content normal.   Nursing note and vitals reviewed.        Assessment:       1. Viral syndrome        Office Visit on 12/08/2020   Component Date Value Ref Range Status    POC Rapid COVID 12/08/2020 Negative  Negative Final     Acceptable 12/08/2020 Yes   Final     Plan:         Viral syndrome  -     POCT COVID-19 Rapid Screening      Patient Instructions   Below are suggestions for symptomatic relief:   -Tylenol every 4 hours OR ibuprofen every 6 hours as needed for pain/fever.   -Salt water gargles to soothe throat pain.   -Chloroseptic spray also helps to numb throat pain.   -Nasal saline spray reduces inflammation and dryness.   -Warm face compresses to help with facial sinus pain/pressure.   -Vicks vapor rub at night.   -Flonase OTC or Nasacort OTC for nasal congestion.   -Simple foods like chicken noodle soup.   -Delsym helps with coughing at night   -Zyrtec/Claritin during the day & Benadryl at night may help with allergies.     If you DO NOT  have Hypertension or any history of palpitations, it is ok to take over the counter Sudafed or Mucinex D or Allegra-D or Claritin-D or Zyrtec-D.  If you do take one of the above, it is ok to combine that with plain over the counter Mucinex or Allegra or Claritin or Zyrtec. If, for example, you are taking Zyrtec -D, you can combine that with Mucinex, but not Mucinex-D.  If you are taking Mucinex-D, you can combine that with plain Allegra or Claritin or Zyrtec.   If you DO have Hypertension or palpitations, it is safe to take Coricidin HBP for relief of sinus symptoms.    Please follow up with your primary care provider within 2-5 days if your signs and symptoms have not resolved or worsen.     If your condition worsens or fails to improve we recommend that you receive another evaluation at the emergency room immediately or contact your primary medical clinic to discuss your concerns.   You must understand that you have received an Urgent Care treatment only and that you may be released before all of your medical problems are known or treated. You, the patient, will arrange for follow up care as instructed.

## 2021-01-06 ENCOUNTER — TELEPHONE (OUTPATIENT)
Dept: URGENT CARE | Facility: CLINIC | Age: 46
End: 2021-01-06

## 2021-01-06 ENCOUNTER — CLINICAL SUPPORT (OUTPATIENT)
Dept: URGENT CARE | Facility: CLINIC | Age: 46
End: 2021-01-06
Payer: COMMERCIAL

## 2021-01-06 DIAGNOSIS — U07.1 COVID-19 VIRUS DETECTED: ICD-10-CM

## 2021-01-06 DIAGNOSIS — Z11.9 SCREENING EXAMINATION FOR UNSPECIFIED INFECTIOUS DISEASE: Primary | ICD-10-CM

## 2021-01-06 LAB
CTP QC/QA: YES
SARS-COV-2 RDRP RESP QL NAA+PROBE: POSITIVE

## 2021-01-06 PROCEDURE — U0002: ICD-10-PCS | Mod: QW,S$GLB,, | Performed by: FAMILY MEDICINE

## 2021-01-06 PROCEDURE — 99211 PR OFFICE/OUTPT VISIT, EST, LEVL I: ICD-10-PCS | Mod: S$GLB,,, | Performed by: FAMILY MEDICINE

## 2021-01-06 PROCEDURE — U0002 COVID-19 LAB TEST NON-CDC: HCPCS | Mod: QW,S$GLB,, | Performed by: FAMILY MEDICINE

## 2021-01-06 PROCEDURE — 99211 OFF/OP EST MAY X REQ PHY/QHP: CPT | Mod: S$GLB,,, | Performed by: FAMILY MEDICINE

## 2021-02-09 ENCOUNTER — TELEPHONE (OUTPATIENT)
Dept: HEPATOLOGY | Facility: CLINIC | Age: 46
End: 2021-02-09

## 2021-04-16 ENCOUNTER — PATIENT MESSAGE (OUTPATIENT)
Dept: RESEARCH | Facility: HOSPITAL | Age: 46
End: 2021-04-16

## 2021-07-15 ENCOUNTER — PATIENT MESSAGE (OUTPATIENT)
Dept: INTERNAL MEDICINE | Facility: CLINIC | Age: 46
End: 2021-07-15

## 2021-08-17 ENCOUNTER — OFFICE VISIT (OUTPATIENT)
Dept: URGENT CARE | Facility: CLINIC | Age: 46
End: 2021-08-17
Payer: COMMERCIAL

## 2021-08-17 VITALS
RESPIRATION RATE: 20 BRPM | TEMPERATURE: 99 F | SYSTOLIC BLOOD PRESSURE: 135 MMHG | DIASTOLIC BLOOD PRESSURE: 94 MMHG | OXYGEN SATURATION: 97 % | HEIGHT: 64 IN | BODY MASS INDEX: 26.46 KG/M2 | HEART RATE: 80 BPM | WEIGHT: 155 LBS

## 2021-08-17 DIAGNOSIS — M62.830 BACK MUSCLE SPASM: ICD-10-CM

## 2021-08-17 DIAGNOSIS — J06.9 VIRAL URI: Primary | ICD-10-CM

## 2021-08-17 LAB
CTP QC/QA: YES
SARS-COV-2 RDRP RESP QL NAA+PROBE: NEGATIVE

## 2021-08-17 PROCEDURE — 3075F PR MOST RECENT SYSTOLIC BLOOD PRESS GE 130-139MM HG: ICD-10-PCS | Mod: CPTII,S$GLB,, | Performed by: PHYSICIAN ASSISTANT

## 2021-08-17 PROCEDURE — U0002: ICD-10-PCS | Mod: QW,S$GLB,, | Performed by: PHYSICIAN ASSISTANT

## 2021-08-17 PROCEDURE — 3075F SYST BP GE 130 - 139MM HG: CPT | Mod: CPTII,S$GLB,, | Performed by: PHYSICIAN ASSISTANT

## 2021-08-17 PROCEDURE — 1160F RVW MEDS BY RX/DR IN RCRD: CPT | Mod: CPTII,S$GLB,, | Performed by: PHYSICIAN ASSISTANT

## 2021-08-17 PROCEDURE — 1160F PR REVIEW ALL MEDS BY PRESCRIBER/CLIN PHARMACIST DOCUMENTED: ICD-10-PCS | Mod: CPTII,S$GLB,, | Performed by: PHYSICIAN ASSISTANT

## 2021-08-17 PROCEDURE — U0002 COVID-19 LAB TEST NON-CDC: HCPCS | Mod: QW,S$GLB,, | Performed by: PHYSICIAN ASSISTANT

## 2021-08-17 PROCEDURE — 99214 OFFICE O/P EST MOD 30 MIN: CPT | Mod: S$GLB,CS,, | Performed by: PHYSICIAN ASSISTANT

## 2021-08-17 PROCEDURE — 99214 PR OFFICE/OUTPT VISIT, EST, LEVL IV, 30-39 MIN: ICD-10-PCS | Mod: S$GLB,CS,, | Performed by: PHYSICIAN ASSISTANT

## 2021-08-17 PROCEDURE — 3008F PR BODY MASS INDEX (BMI) DOCUMENTED: ICD-10-PCS | Mod: CPTII,S$GLB,, | Performed by: PHYSICIAN ASSISTANT

## 2021-08-17 PROCEDURE — 3008F BODY MASS INDEX DOCD: CPT | Mod: CPTII,S$GLB,, | Performed by: PHYSICIAN ASSISTANT

## 2021-08-17 PROCEDURE — 3080F PR MOST RECENT DIASTOLIC BLOOD PRESSURE >= 90 MM HG: ICD-10-PCS | Mod: CPTII,S$GLB,, | Performed by: PHYSICIAN ASSISTANT

## 2021-08-17 PROCEDURE — 3080F DIAST BP >= 90 MM HG: CPT | Mod: CPTII,S$GLB,, | Performed by: PHYSICIAN ASSISTANT

## 2021-08-17 PROCEDURE — 1159F MED LIST DOCD IN RCRD: CPT | Mod: CPTII,S$GLB,, | Performed by: PHYSICIAN ASSISTANT

## 2021-08-17 PROCEDURE — 1159F PR MEDICATION LIST DOCUMENTED IN MEDICAL RECORD: ICD-10-PCS | Mod: CPTII,S$GLB,, | Performed by: PHYSICIAN ASSISTANT

## 2021-08-17 RX ORDER — CYCLOBENZAPRINE HCL 5 MG
5 TABLET ORAL 3 TIMES DAILY PRN
Qty: 10 TABLET | Refills: 0 | Status: SHIPPED | OUTPATIENT
Start: 2021-08-17 | End: 2021-08-27

## 2022-04-12 ENCOUNTER — OFFICE VISIT (OUTPATIENT)
Dept: FAMILY MEDICINE | Facility: CLINIC | Age: 47
End: 2022-04-12
Attending: OBSTETRICS & GYNECOLOGY
Payer: COMMERCIAL

## 2022-04-12 ENCOUNTER — LAB VISIT (OUTPATIENT)
Dept: LAB | Facility: HOSPITAL | Age: 47
End: 2022-04-12
Attending: FAMILY MEDICINE
Payer: COMMERCIAL

## 2022-04-12 VITALS
DIASTOLIC BLOOD PRESSURE: 85 MMHG | SYSTOLIC BLOOD PRESSURE: 119 MMHG | HEIGHT: 64 IN | BODY MASS INDEX: 25.84 KG/M2 | HEART RATE: 67 BPM | OXYGEN SATURATION: 97 % | WEIGHT: 151.38 LBS

## 2022-04-12 DIAGNOSIS — K74.00 LIVER FIBROSIS: ICD-10-CM

## 2022-04-12 DIAGNOSIS — Z00.00 ROUTINE HEALTH MAINTENANCE: Primary | ICD-10-CM

## 2022-04-12 DIAGNOSIS — Z00.00 ROUTINE HEALTH MAINTENANCE: ICD-10-CM

## 2022-04-12 DIAGNOSIS — Z12.11 COLON CANCER SCREENING: ICD-10-CM

## 2022-04-12 LAB
AFP SERPL-MCNC: 4.4 NG/ML (ref 0–8.4)
ALBUMIN SERPL BCP-MCNC: 4 G/DL (ref 3.5–5.2)
ALP SERPL-CCNC: 83 U/L (ref 55–135)
ALT SERPL W/O P-5'-P-CCNC: 84 U/L (ref 10–44)
ANION GAP SERPL CALC-SCNC: 11 MMOL/L (ref 8–16)
AST SERPL-CCNC: 110 U/L (ref 10–40)
BASOPHILS # BLD AUTO: 0.03 K/UL (ref 0–0.2)
BASOPHILS NFR BLD: 0.5 % (ref 0–1.9)
BILIRUB SERPL-MCNC: 0.6 MG/DL (ref 0.1–1)
BUN SERPL-MCNC: 6 MG/DL (ref 6–20)
CALCIUM SERPL-MCNC: 9.6 MG/DL (ref 8.7–10.5)
CHLORIDE SERPL-SCNC: 104 MMOL/L (ref 95–110)
CHOLEST SERPL-MCNC: 258 MG/DL (ref 120–199)
CHOLEST/HDLC SERPL: 3.7 {RATIO} (ref 2–5)
CO2 SERPL-SCNC: 25 MMOL/L (ref 23–29)
CREAT SERPL-MCNC: 0.8 MG/DL (ref 0.5–1.4)
DIFFERENTIAL METHOD: ABNORMAL
EOSINOPHIL # BLD AUTO: 0.3 K/UL (ref 0–0.5)
EOSINOPHIL NFR BLD: 5 % (ref 0–8)
ERYTHROCYTE [DISTWIDTH] IN BLOOD BY AUTOMATED COUNT: 12.7 % (ref 11.5–14.5)
EST. GFR  (AFRICAN AMERICAN): >60 ML/MIN/1.73 M^2
EST. GFR  (NON AFRICAN AMERICAN): >60 ML/MIN/1.73 M^2
GLUCOSE SERPL-MCNC: 82 MG/DL (ref 70–110)
HCT VFR BLD AUTO: 49.1 % (ref 40–54)
HDLC SERPL-MCNC: 69 MG/DL (ref 40–75)
HDLC SERPL: 26.7 % (ref 20–50)
HGB BLD-MCNC: 15.7 G/DL (ref 14–18)
IMM GRANULOCYTES # BLD AUTO: 0.02 K/UL (ref 0–0.04)
IMM GRANULOCYTES NFR BLD AUTO: 0.3 % (ref 0–0.5)
INR PPP: 1 (ref 0.8–1.2)
LDLC SERPL CALC-MCNC: 172.8 MG/DL (ref 63–159)
LYMPHOCYTES # BLD AUTO: 1.9 K/UL (ref 1–4.8)
LYMPHOCYTES NFR BLD: 31.1 % (ref 18–48)
MCH RBC QN AUTO: 32.4 PG (ref 27–31)
MCHC RBC AUTO-ENTMCNC: 32 G/DL (ref 32–36)
MCV RBC AUTO: 101 FL (ref 82–98)
MONOCYTES # BLD AUTO: 0.7 K/UL (ref 0.3–1)
MONOCYTES NFR BLD: 11.3 % (ref 4–15)
NEUTROPHILS # BLD AUTO: 3.1 K/UL (ref 1.8–7.7)
NEUTROPHILS NFR BLD: 51.8 % (ref 38–73)
NONHDLC SERPL-MCNC: 189 MG/DL
NRBC BLD-RTO: 0 /100 WBC
PLATELET # BLD AUTO: 356 K/UL (ref 150–450)
PMV BLD AUTO: 10.3 FL (ref 9.2–12.9)
POTASSIUM SERPL-SCNC: 4.6 MMOL/L (ref 3.5–5.1)
PROT SERPL-MCNC: 7.9 G/DL (ref 6–8.4)
PROTHROMBIN TIME: 10.7 SEC (ref 9–12.5)
RBC # BLD AUTO: 4.84 M/UL (ref 4.6–6.2)
SODIUM SERPL-SCNC: 140 MMOL/L (ref 136–145)
TRIGL SERPL-MCNC: 81 MG/DL (ref 30–150)
WBC # BLD AUTO: 5.95 K/UL (ref 3.9–12.7)

## 2022-04-12 PROCEDURE — 3079F DIAST BP 80-89 MM HG: CPT | Mod: CPTII,S$GLB,, | Performed by: FAMILY MEDICINE

## 2022-04-12 PROCEDURE — 99999 PR PBB SHADOW E&M-EST. PATIENT-LVL IV: ICD-10-PCS | Mod: PBBFAC,,, | Performed by: FAMILY MEDICINE

## 2022-04-12 PROCEDURE — 1159F MED LIST DOCD IN RCRD: CPT | Mod: CPTII,S$GLB,, | Performed by: FAMILY MEDICINE

## 2022-04-12 PROCEDURE — 3008F PR BODY MASS INDEX (BMI) DOCUMENTED: ICD-10-PCS | Mod: CPTII,S$GLB,, | Performed by: FAMILY MEDICINE

## 2022-04-12 PROCEDURE — 3079F PR MOST RECENT DIASTOLIC BLOOD PRESSURE 80-89 MM HG: ICD-10-PCS | Mod: CPTII,S$GLB,, | Performed by: FAMILY MEDICINE

## 2022-04-12 PROCEDURE — 3074F SYST BP LT 130 MM HG: CPT | Mod: CPTII,S$GLB,, | Performed by: FAMILY MEDICINE

## 2022-04-12 PROCEDURE — 36415 COLL VENOUS BLD VENIPUNCTURE: CPT | Mod: PO | Performed by: FAMILY MEDICINE

## 2022-04-12 PROCEDURE — 3008F BODY MASS INDEX DOCD: CPT | Mod: CPTII,S$GLB,, | Performed by: FAMILY MEDICINE

## 2022-04-12 PROCEDURE — 80053 COMPREHEN METABOLIC PANEL: CPT | Performed by: FAMILY MEDICINE

## 2022-04-12 PROCEDURE — 1159F PR MEDICATION LIST DOCUMENTED IN MEDICAL RECORD: ICD-10-PCS | Mod: CPTII,S$GLB,, | Performed by: FAMILY MEDICINE

## 2022-04-12 PROCEDURE — 99396 PREV VISIT EST AGE 40-64: CPT | Mod: S$GLB,,, | Performed by: FAMILY MEDICINE

## 2022-04-12 PROCEDURE — 85025 COMPLETE CBC W/AUTO DIFF WBC: CPT | Performed by: FAMILY MEDICINE

## 2022-04-12 PROCEDURE — 85610 PROTHROMBIN TIME: CPT | Performed by: FAMILY MEDICINE

## 2022-04-12 PROCEDURE — 99999 PR PBB SHADOW E&M-EST. PATIENT-LVL IV: CPT | Mod: PBBFAC,,, | Performed by: FAMILY MEDICINE

## 2022-04-12 PROCEDURE — 82105 ALPHA-FETOPROTEIN SERUM: CPT | Performed by: FAMILY MEDICINE

## 2022-04-12 PROCEDURE — 3074F PR MOST RECENT SYSTOLIC BLOOD PRESSURE < 130 MM HG: ICD-10-PCS | Mod: CPTII,S$GLB,, | Performed by: FAMILY MEDICINE

## 2022-04-12 PROCEDURE — 80061 LIPID PANEL: CPT | Performed by: FAMILY MEDICINE

## 2022-04-12 PROCEDURE — 99396 PR PREVENTIVE VISIT,EST,40-64: ICD-10-PCS | Mod: S$GLB,,, | Performed by: FAMILY MEDICINE

## 2022-04-12 NOTE — ASSESSMENT & PLAN NOTE
Routine labs today as well as ordered referral for GI to get colonoscopy set up.  Main focus for prevention needs to be related to liver health and alcohol cessation.

## 2022-04-12 NOTE — PROGRESS NOTES
"Subjective:       Patient ID: Lance Hampton is a 46 y.o. male.    Chief Complaint: Annual Exam    HPI  Patient came in today for annual exam.  Current medical conditions include liver fibrosis secondary to alcohol intake.  In the past was following up with hepatology at [facility] however has not had a visit with him in about 1 and half years.  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  This along with having a baby about 2 years ago has increased stress and he has been drinking more alcohol over the last couple of years whereas before he had improved.  Discussed options for helping him to decrease alcohol intake and suggested naltrexone as a possible option but acknowledged that he 1st needs to have the desire to quit drinking.    Due for initial colonoscopy.  Also due for surveillance labs for liver and imaging as well as follow up with hepatology.    Family History   Problem Relation Age of Onset    Alcohol abuse Father     Cancer Maternal Grandfather         prostate cancer    Alcohol abuse Paternal Grandfather     Cirrhosis Paternal Grandfather      No current outpatient medications on file.    Review of Systems   Constitutional: Negative for chills and fever.   Eyes: Negative for visual disturbance.   Respiratory: Negative for cough and shortness of breath.    Cardiovascular: Negative for chest pain.   Gastrointestinal: Negative for abdominal pain.   Neurological: Negative for dizziness.       Objective:   /85   Pulse 67   Ht 5' 4" (1.626 m)   Wt 68.7 kg (151 lb 6.4 oz)   SpO2 97%   BMI 25.99 kg/m²      Physical Exam  Vitals reviewed.   Constitutional:       General: He is not in acute distress.     Appearance: He is well-developed. He is not diaphoretic.   HENT:      Head: Normocephalic and atraumatic.      Nose: Nose normal.   Eyes:      General:         Right eye: No discharge.         Left eye: No " discharge.      Conjunctiva/sclera: Conjunctivae normal.      Pupils: Pupils are equal, round, and reactive to light.   Neck:      Thyroid: No thyromegaly.   Cardiovascular:      Rate and Rhythm: Normal rate and regular rhythm.      Heart sounds: Normal heart sounds. No murmur heard.  Pulmonary:      Effort: Pulmonary effort is normal. No respiratory distress.      Breath sounds: Normal breath sounds. No wheezing.   Abdominal:      General: There is no distension.      Palpations: Abdomen is soft.   Skin:     General: Skin is warm.      Findings: No rash.   Neurological:      Mental Status: He is alert and oriented to person, place, and time.   Psychiatric:         Behavior: Behavior normal.         Assessment & Plan     Problem List Items Addressed This Visit        GI    Liver fibrosis    Current Assessment & Plan     Getting updated imagin and labs.  Also scheduling follow-up appointment with hepatology to get him plugged back in.  Offered naltrexone as possible agent to help him with alcohol cessation.  He plans to read up on this.           Relevant Orders    Comprehensive Metabolic Panel    US Abdomen Complete    CBC Auto Differential    Protime-INR    AFP TUMOR MARKER       Other    Routine health maintenance - Primary    Current Assessment & Plan     Routine labs today as well as ordered referral for GI to get colonoscopy set up.  Main focus for prevention needs to be related to liver health and alcohol cessation.           Relevant Orders    Lipid Panel      Other Visit Diagnoses     Colon cancer screening        Relevant Orders    Ambulatory referral/consult to Gastroenterology            Immunizations Administered on Date of Encounter - 4/12/2022     No immunizations on file.           No follow-ups on file.    Disclaimer:  This note may have been prepared using voice recognition software, it may have not been extensively proofed, as such there could be errors within the text such as sound alike  errors.

## 2022-04-12 NOTE — ASSESSMENT & PLAN NOTE
Getting updated imagin and labs.  Also scheduling follow-up appointment with hepatology to get him plugged back in.  Offered naltrexone as possible agent to help him with alcohol cessation.  He plans to read up on this.

## 2022-05-03 ENCOUNTER — HOSPITAL ENCOUNTER (OUTPATIENT)
Dept: RADIOLOGY | Facility: HOSPITAL | Age: 47
Discharge: HOME | End: 2022-05-03
Attending: FAMILY MEDICINE
Payer: COMMERCIAL

## 2022-05-03 DIAGNOSIS — K74.00 LIVER FIBROSIS: ICD-10-CM

## 2022-05-03 PROCEDURE — 76700 US EXAM ABDOM COMPLETE: CPT | Mod: TC

## 2022-05-03 PROCEDURE — 76700 US EXAM ABDOM COMPLETE: CPT | Mod: 26,,, | Performed by: RADIOLOGY

## 2022-05-03 PROCEDURE — 76700 US ABDOMEN COMPLETE: ICD-10-PCS | Mod: 26,,, | Performed by: RADIOLOGY

## 2022-05-05 ENCOUNTER — TELEPHONE (OUTPATIENT)
Dept: HEPATOLOGY | Facility: CLINIC | Age: 47
End: 2022-05-05
Payer: COMMERCIAL

## 2022-05-05 ENCOUNTER — PATIENT OUTREACH (OUTPATIENT)
Dept: ADMINISTRATIVE | Facility: OTHER | Age: 47
End: 2022-05-05
Payer: COMMERCIAL

## 2022-05-05 NOTE — PROGRESS NOTES
Health Maintenance Due   Topic Date Due    COVID-19 Vaccine (3 - Booster for Moderna series) 09/19/2021    Colorectal Cancer Screening  Never done     Updates were requested from care everywhere.  Chart was reviewed for overdue Proactive [facility]coun[facility] ([facility]) topics (CRS, Breast Cancer Screening, Eye exam)  Health Maintenance has been updated.  [place] immunization registry triggered.  Immunizations were reconciled.  Ambulatory referral/consult to GastroenterologyExpected 4/19/2022

## 2022-05-09 ENCOUNTER — PATIENT MESSAGE (OUTPATIENT)
Dept: HEPATOLOGY | Facility: CLINIC | Age: 47
End: 2022-05-09
Payer: COMMERCIAL

## 2022-05-25 ENCOUNTER — OFFICE VISIT (OUTPATIENT)
Dept: HEPATOLOGY | Facility: CLINIC | Age: 47
End: 2022-05-25
Payer: COMMERCIAL

## 2022-05-25 VITALS
RESPIRATION RATE: 18 BRPM | HEIGHT: 64 IN | DIASTOLIC BLOOD PRESSURE: 86 MMHG | BODY MASS INDEX: 25.45 KG/M2 | WEIGHT: 149.06 LBS | HEART RATE: 93 BPM | OXYGEN SATURATION: 98 % | TEMPERATURE: 98 F | SYSTOLIC BLOOD PRESSURE: 135 MMHG

## 2022-05-25 DIAGNOSIS — K70.0 ALCOHOL INDUCED FATTY LIVER: ICD-10-CM

## 2022-05-25 DIAGNOSIS — F10.90 ALCOHOL INTAKE ABOVE RECOMMENDED SENSIBLE LIMITS: ICD-10-CM

## 2022-05-25 DIAGNOSIS — K74.02 HEPATIC FIBROSIS, STAGE 3: Primary | ICD-10-CM

## 2022-05-25 DIAGNOSIS — R74.8 ELEVATED LIVER ENZYMES: ICD-10-CM

## 2022-05-25 PROCEDURE — 99999 PR PBB SHADOW E&M-EST. PATIENT-LVL IV: ICD-10-PCS | Mod: PBBFAC,,, | Performed by: NURSE PRACTITIONER

## 2022-05-25 PROCEDURE — 1159F PR MEDICATION LIST DOCUMENTED IN MEDICAL RECORD: ICD-10-PCS | Mod: CPTII,S$GLB,, | Performed by: NURSE PRACTITIONER

## 2022-05-25 PROCEDURE — 3079F PR MOST RECENT DIASTOLIC BLOOD PRESSURE 80-89 MM HG: ICD-10-PCS | Mod: CPTII,S$GLB,, | Performed by: NURSE PRACTITIONER

## 2022-05-25 PROCEDURE — 99999 PR PBB SHADOW E&M-EST. PATIENT-LVL IV: CPT | Mod: PBBFAC,,, | Performed by: NURSE PRACTITIONER

## 2022-05-25 PROCEDURE — 99214 PR OFFICE/OUTPT VISIT, EST, LEVL IV, 30-39 MIN: ICD-10-PCS | Mod: S$GLB,,, | Performed by: NURSE PRACTITIONER

## 2022-05-25 PROCEDURE — 3008F BODY MASS INDEX DOCD: CPT | Mod: CPTII,S$GLB,, | Performed by: NURSE PRACTITIONER

## 2022-05-25 PROCEDURE — 3075F PR MOST RECENT SYSTOLIC BLOOD PRESS GE 130-139MM HG: ICD-10-PCS | Mod: CPTII,S$GLB,, | Performed by: NURSE PRACTITIONER

## 2022-05-25 PROCEDURE — 99214 OFFICE O/P EST MOD 30 MIN: CPT | Mod: S$GLB,,, | Performed by: NURSE PRACTITIONER

## 2022-05-25 PROCEDURE — 3079F DIAST BP 80-89 MM HG: CPT | Mod: CPTII,S$GLB,, | Performed by: NURSE PRACTITIONER

## 2022-05-25 PROCEDURE — 3008F PR BODY MASS INDEX (BMI) DOCUMENTED: ICD-10-PCS | Mod: CPTII,S$GLB,, | Performed by: NURSE PRACTITIONER

## 2022-05-25 PROCEDURE — 3075F SYST BP GE 130 - 139MM HG: CPT | Mod: CPTII,S$GLB,, | Performed by: NURSE PRACTITIONER

## 2022-05-25 PROCEDURE — 1159F MED LIST DOCD IN RCRD: CPT | Mod: CPTII,S$GLB,, | Performed by: NURSE PRACTITIONER

## 2022-05-25 RX ORDER — CETIRIZINE HYDROCHLORIDE 10 MG/1
TABLET, ORALLY DISINTEGRATING ORAL
COMMUNITY
End: 2022-11-29

## 2022-05-25 NOTE — PROGRESS NOTES
[facility] - Established Patient    Last Clinic Visit: 6/9/20    Chief Complaint: Follow-up for fatty liver, elevated liver enzymes, hepatic fibrosis       HISTORY     This is a 46 y.o. male with PMH noted below, here for follow-up of fatty liver, elevated liver enzymes, and hepatic fibrosis.     Fatty liver first noted on abd US in 2013.    His transaminases have been elevated since at least 2011, max 200s.  Synthetic liver function WNL. Enzymes have previously improved with a reduction in alcohol use.    Serologic workup has been negative for Florencio's, alpha-1 antitrypsin deficiency, autoimmune etiology, and viral hepatitis.     Ferritin elevated >1200 though iron sat WNL; HH DNA negative for C282Y and H63D mutations. Suspected elevated ferritin due to alcohol/hepatic inflammation. PETH 300s-1000s.      Fibrosis staging:   Fibroscan 7/10/19 = F3 (kPa 11.5), S2    Health Maintenance:  -- HCC screening: abd US 5/3/22 with no focal hepatic lesions; AFP 4.4   -- Variceal screening: n/a   -- Hepatitis A & B vaccination: +immunity    Interval history:  Feels well overall, no concerns.  Notes that he is always tired though work has been busy and he has a 2 year old at home.     Updates on risk factors for fatty liver:  · Weight -- Body mass index is 25.58 kg/m².                        · Dyslipidemia -- mildly elevated                               · Insulin resistance / diabetes -- no        · Hypertension -- BP well controlled   · Alcohol use -- every day, 1-3 drinks. Has previously been able to stop for years without difficulty. Knows he needs to decrease alcohol use.     Liver enzymes last month: , ALT 84        Past medical history, surgical history, problem list, family history, social history, allergies: Reviewed and updated in the appropriate section of the electronic medical record.      Current Outpatient Medications   Medication Sig Dispense Refill    cetirizine (CHILDREN'S ZYRTEC  "ALLERGY) 10 mg TbDL 1 tablet as needed       No current facility-administered medications for this visit.     Medication list reviewed and updated.      Review of Systems   Constitutional: Negative for unexpected weight change. +fatigue   Respiratory: Negative for shortness of breath.    Cardiovascular: Negative for leg swelling.  Gastrointestinal: Negative for abdominal distention, abdominal pain, diarrhea, constipation, nausea, and vomiting. Negative for blood in stool, melena, or hematemesis.  Musculoskeletal: Negative for myalgias.    Skin: Negative for itching.  Neurological: Negative for dizziness or tremors. Negative for confusion, slowed mentation, or memory loss.   Hematological: Does not bruise/bleed easily.   Psychiatric: Negative for mood changes or sleep disturbance.      Physical Exam   Constitutional: Well-nourished. No distress. Alert and oriented.  Eyes: No scleral icterus.   Pulmonary/Chest: Respiratory effort normal. No respiratory distress.   Abdominal: No distension, no ascites appreciated.   Extremities: No edema.   Neurological: No tremor or asterixis. Gait normal.  Skin: No jaundice. No spider telangiectasias or palmar erythema.  Psychiatric: Normal mood and affect. Speech, behavior, and thought content normal. No depression or anxiety noted.       Vitals reviewed.  /86 (BP Location: Right arm, Patient Position: Sitting, BP Method: Medium (Automatic))   Pulse 93   Temp 98 °F (36.7 °C) (Oral)   Resp 18   Ht 5' 4" (1.626 m)   Wt 67.6 kg (149 lb 0.5 oz)   SpO2 98%   BMI 25.58 kg/m²       LABS & DIAGNOSTIC STUDIES     I have personally reviewed pertinent laboratory findings:    Lab Results   Component Value Date    ALT 84 (H) 04/12/2022     (H) 04/12/2022    ALKPHOS 83 04/12/2022    BILITOT 0.6 04/12/2022    ALBUMIN 4.0 04/12/2022    INR 1.0 04/12/2022       Lab Results   Component Value Date    WBC 5.95 04/12/2022    HGB 15.7 04/12/2022    HCT 49.1 04/12/2022     (H) " 04/12/2022     04/12/2022       Lab Results   Component Value Date     04/12/2022    K 4.6 04/12/2022    BUN 6 04/12/2022    CREATININE 0.8 04/12/2022    ESTGFRAFRICA >60.0 04/12/2022    EGFRNONAA >60.0 04/12/2022       Lab Results   Component Value Date    SMOOTHMUSCAB Negative 1:40 05/23/2019    AMAIFA Negative 1:40 05/23/2019    IGGSERUM 1023 05/23/2019    ANASCREEN Negative <1:160 05/23/2019    FERRITIN 1,269 (H) 03/18/2014    FESATURATED 26 03/18/2014    PETH 901 (A) 06/09/2020    VLWOH5FWLAGC MM 03/18/2014    AHALG8AVXVRX 133 03/18/2014    CERULOPLSM 19.0 03/18/2014    HEPBSAG Negative 05/23/2019    HEPBIGM Negative 03/06/2014    HEPBCAB Negative 05/23/2019    HEPCAB Negative 05/23/2019    HEPAIGM Negative 03/06/2014       Lab Results   Component Value Date    AFP 4.4 04/12/2022       I have personally reviewed the following result reports:  Abdominal US - 5/3/22      ASSESSMENT & PLAN     46 y.o. male with:    1. Fatty liver, elevated liver enzymes, hepatic fibrosis (F3 on Fibroscan) - due to alcohol  -- Transaminases remain elevated, due to continued daily alcohol use. Other metabolic risk factors for fatty liver are well controlled.  -- His Fibroscan in 2019 suggested advanced fibrosis; can repeat prior to next visit.   -- Anticipate that liver enzymes improve with a reduction in alcohol use though discussed that abstinence is recommended.   -- Synthetic liver function remains normal and no s/s hepatic decompensation at this time. Monitor LFTs every 6 months.  -- Given Fibroscan F3, will continue HCC screening every 6 months with ultrasound and AFP, next due 11/2022    2. Daily alcohol use  -- Discussed that continued alcohol use and subsequent hepatic inflammation increases the risk of fibrosis progression (including cirrhosis)  -- He acknowledges the need to decrease alcohol use though is not fully committed to making this change right now       Orders Placed This Encounter   Procedures     FibroScan (Vibration Controlled Transient Elastography)    US Abdomen Limited    CBC Without Differential    Comprehensive Metabolic Panel    Protime-INR    AFP Tumor Marker    Phosphatidylethanol (PETH)       *See AVS for patient education and instructions.      Return to clinic in 6 months with labs, US, and Fibroscan prior.       Thank you for allowing me to participate in the care of Lance Reyna Aislinnthony    [place] Grande, FNP-C  Hepatology        Duration of encounter: 30 min  This includes face to face time and non-face to face time preparing to see the patient (eg, review of tests), obtaining and/or reviewing separately obtained history, documenting clinical information in the electronic or other health record, independently interpreting results and communicating results to the patient/family/caregiver, or care coordination.

## 2022-05-25 NOTE — PATIENT INSTRUCTIONS
Continue to decrease alcohol use with goal of abstinence. Anticipate that your liver enzymes improve as you do this.  Will continue labs and ultrasound every 6 months as your last Fibroscan suggested advanced liver scarring.  Repeat Fibroscan prior to next visit      Your testing shows you have stage 3 scar tissue in the liver which is pre-cirrhosis.  Your liver is working well. We want to protect it and prevent further damage.  AVOID ALL alcohol (includes beer, wine and liquor)  AVOID non-steroidal anti-inflammatory drugs (NSAIDs) such as ibuprofen (Motrin, Advil), naproxen (Naprosyn, Aleve), meloxicam (Mobic)   Tylenol/acetaminophen is OKAY for pain, no more than 2000 mg per day  No raw shellfish due to infection risk.     Liver cancer screening (ultrasound and blood test) is needed every 6 months.  - Next due: Nov 2022

## 2022-06-08 ENCOUNTER — PATIENT MESSAGE (OUTPATIENT)
Dept: ADMINISTRATIVE | Facility: HOSPITAL | Age: 47
End: 2022-06-08
Payer: COMMERCIAL

## 2022-07-18 PROBLEM — Z00.00 ROUTINE HEALTH MAINTENANCE: Status: RESOLVED | Noted: 2022-04-12 | Resolved: 2022-07-18

## 2022-09-07 LAB — CRC RECOMMENDATION EXT: NORMAL

## 2022-10-13 ENCOUNTER — PATIENT MESSAGE (OUTPATIENT)
Dept: ADMINISTRATIVE | Facility: HOSPITAL | Age: 47
End: 2022-10-13
Payer: COMMERCIAL

## 2022-11-02 ENCOUNTER — PATIENT OUTREACH (OUTPATIENT)
Dept: ADMINISTRATIVE | Facility: HOSPITAL | Age: 47
End: 2022-11-02
Payer: COMMERCIAL

## 2022-11-22 ENCOUNTER — HOSPITAL ENCOUNTER (OUTPATIENT)
Dept: RADIOLOGY | Facility: HOSPITAL | Age: 47
Discharge: HOME | End: 2022-11-22
Attending: NURSE PRACTITIONER
Payer: COMMERCIAL

## 2022-11-22 DIAGNOSIS — K74.02 HEPATIC FIBROSIS, STAGE 3: ICD-10-CM

## 2022-11-22 DIAGNOSIS — R74.8 ELEVATED LIVER ENZYMES: ICD-10-CM

## 2022-11-22 PROCEDURE — 76705 US ABDOMEN LIMITED: ICD-10-PCS | Mod: 26,,, | Performed by: RADIOLOGY

## 2022-11-22 PROCEDURE — 76705 ECHO EXAM OF ABDOMEN: CPT | Mod: TC

## 2022-11-22 PROCEDURE — 76705 ECHO EXAM OF ABDOMEN: CPT | Mod: 26,,, | Performed by: RADIOLOGY

## 2022-11-29 ENCOUNTER — PROCEDURE VISIT (OUTPATIENT)
Dept: HEPATOLOGY | Facility: CLINIC | Age: 47
End: 2022-11-29
Payer: COMMERCIAL

## 2022-11-29 ENCOUNTER — OFFICE VISIT (OUTPATIENT)
Dept: HEPATOLOGY | Facility: CLINIC | Age: 47
End: 2022-11-29
Payer: COMMERCIAL

## 2022-11-29 VITALS
SYSTOLIC BLOOD PRESSURE: 122 MMHG | TEMPERATURE: 98 F | BODY MASS INDEX: 24.92 KG/M2 | RESPIRATION RATE: 18 BRPM | HEART RATE: 71 BPM | OXYGEN SATURATION: 98 % | WEIGHT: 145.94 LBS | DIASTOLIC BLOOD PRESSURE: 82 MMHG | HEIGHT: 64 IN

## 2022-11-29 DIAGNOSIS — K74.02 HEPATIC FIBROSIS, STAGE 3: ICD-10-CM

## 2022-11-29 DIAGNOSIS — K70.0 ALCOHOL INDUCED FATTY LIVER: Primary | ICD-10-CM

## 2022-11-29 DIAGNOSIS — R74.8 ELEVATED LIVER ENZYMES: ICD-10-CM

## 2022-11-29 DIAGNOSIS — K74.60 CIRRHOSIS OF LIVER WITHOUT ASCITES, UNSPECIFIED HEPATIC CIRRHOSIS TYPE: ICD-10-CM

## 2022-11-29 PROBLEM — F10.10 ALCOHOL ABUSE: Status: ACTIVE | Noted: 2019-04-15

## 2022-11-29 PROCEDURE — 3074F PR MOST RECENT SYSTOLIC BLOOD PRESSURE < 130 MM HG: ICD-10-PCS | Mod: CPTII,S$GLB,, | Performed by: NURSE PRACTITIONER

## 2022-11-29 PROCEDURE — 3008F BODY MASS INDEX DOCD: CPT | Mod: CPTII,S$GLB,, | Performed by: NURSE PRACTITIONER

## 2022-11-29 PROCEDURE — 99999 PR PBB SHADOW E&M-EST. PATIENT-LVL III: CPT | Mod: PBBFAC,,, | Performed by: NURSE PRACTITIONER

## 2022-11-29 PROCEDURE — 99999 PR PBB SHADOW E&M-EST. PATIENT-LVL III: ICD-10-PCS | Mod: PBBFAC,,, | Performed by: NURSE PRACTITIONER

## 2022-11-29 PROCEDURE — 3079F DIAST BP 80-89 MM HG: CPT | Mod: CPTII,S$GLB,, | Performed by: NURSE PRACTITIONER

## 2022-11-29 PROCEDURE — 3008F PR BODY MASS INDEX (BMI) DOCUMENTED: ICD-10-PCS | Mod: CPTII,S$GLB,, | Performed by: NURSE PRACTITIONER

## 2022-11-29 PROCEDURE — 3074F SYST BP LT 130 MM HG: CPT | Mod: CPTII,S$GLB,, | Performed by: NURSE PRACTITIONER

## 2022-11-29 PROCEDURE — 76981 USE PARENCHYMA: CPT | Mod: S$GLB,,, | Performed by: NURSE PRACTITIONER

## 2022-11-29 PROCEDURE — 1159F MED LIST DOCD IN RCRD: CPT | Mod: CPTII,S$GLB,, | Performed by: NURSE PRACTITIONER

## 2022-11-29 PROCEDURE — 3079F PR MOST RECENT DIASTOLIC BLOOD PRESSURE 80-89 MM HG: ICD-10-PCS | Mod: CPTII,S$GLB,, | Performed by: NURSE PRACTITIONER

## 2022-11-29 PROCEDURE — 76981 FIBROSCAN (VIBRATION CONTROLLED TRANSIENT ELASTOGRAPHY): ICD-10-PCS | Mod: S$GLB,,, | Performed by: NURSE PRACTITIONER

## 2022-11-29 PROCEDURE — 99214 OFFICE O/P EST MOD 30 MIN: CPT | Mod: S$GLB,,, | Performed by: NURSE PRACTITIONER

## 2022-11-29 PROCEDURE — 1159F PR MEDICATION LIST DOCUMENTED IN MEDICAL RECORD: ICD-10-PCS | Mod: CPTII,S$GLB,, | Performed by: NURSE PRACTITIONER

## 2022-11-29 PROCEDURE — 99214 PR OFFICE/OUTPT VISIT, EST, LEVL IV, 30-39 MIN: ICD-10-PCS | Mod: S$GLB,,, | Performed by: NURSE PRACTITIONER

## 2022-11-29 NOTE — PATIENT INSTRUCTIONS
Labs, Fibroscan, and f/u visit in 3 months    Continue to taper alcohol with goal of abstinence. Let us know if you are interested in resources or seeing psychiatry.    Will continue ultrasounds for liver cancer screening every 6 months- next due May 2023          CIRRHOSIS EDUCATION:  This is a helpful web site about cirrhosis: https://cirrhosiscare.ca/     Because you have cirrhosis, it is extremely important to obtain blood tests and an ultrasound every 6 months to screen for liver cancer (you are at risk for developing liver cancer due to increased scar tissue in the liver).     Signs and symptoms of worsening liver disease include jaundice (yellow skin/eyes), fluid in the abdomen (ascites), and confusion/disorientation/slowed thought processes due to hepatic encephalopathy (toxins building up when the liver is not working properly). You should seek medical attention if any of these things occur. Also, possible bleeding from esophageal varices (blood vessels in the stomach and foodpipe that can burst and cause bleeding). If you have symptoms of vomiting blood, blood in your stool, maroon or black stools, or coffee ground vomit, you should go to the emergency room immediately.     Cirrhosis can increase the risk of impaired liver function or liver failure; however, we will watch your liver function score (MELD score) closely with each clinic visit. A normal MELD score is 6, highest is 40. The MELD score helps to determine when we may need to consider evaluation for liver transplant.     Counseling  -- Strict abstinence from alcohol (includes beer, wine, and/or liquor)  -- Avoid non-steroidal anti-inflammatory drugs (NSAIDs) such as ibuprofen (Motrin, Advil), naproxen (Naprosyn, Aleve), meloxicam (Mobic)   -- Tylenol/acetaminophen is OKAY and should be used as needed for pain, no more than 2000 mg per day  -- Avoid raw shellfish due to the risk of Vibrio vulnificus infection  -- Low salt/sodium diet, less than  2000 mg per day   -- High protein diet to prevent muscle mass loss. Can drink protein shakes (Premier Protein is a great option because it is very high protein and low sugar). A bedtime snack with protein can also be helpful. Example: peanut butter sandwich/crackers  -- Periodic upper endoscopy (EGD) to screen for varices (enlarged blood vessels) in the esophagus and stomach which can increase risk of bleeding  -- Increased risk of liver cancer associated with cirrhosis; therefore, continued screening with ultrasound (or CT / MRI) and AFP tumor marker every 6 months is recommended.

## 2022-11-29 NOTE — PROGRESS NOTES
[facility] - Established Patient    Last Clinic Visit: 5/25/22    Chief Complaint: Follow-up for fatty liver, elevated liver enzymes, hepatic fibrosis       HISTORY     This is a 46 y.o. male with PMH noted below, here for follow-up of fatty liver, elevated liver enzymes, and hepatic fibrosis.     Fatty liver first noted on [facility] US in 2013.    His transaminases have been elevated since at least 2011, max 200s. Synthetic liver function WNL. Enzymes have previously improved with a reduction in alcohol use.    Serologic workup has been negative for Florencio's, alpha-1 antitrypsin deficiency, autoimmune etiology, and viral hepatitis.     Ferritin elevated >1200 though iron sat WNL; HH DNA negative for C282Y and H63D mutations. Suspected elevated ferritin due to alcohol/hepatic inflammation. PETH 300s-1000s.      Fibrosis staging:   Fibroscan 7/10/19 = F3 (kPa 11.5), S2  Fibroscan today = F4 (kPa 19.7), S2    Health Maintenance:  -- HCC screening: [facility] US 11/22/22 without focal hepatic lesion; AFP 5.0  -- Variceal screening: no EGD   -- Hepatitis A & B vaccination: +immunity    Interval history:  Feels well overall, no concerns.    Liver enzymes remain elevated:  /   TBili now mildly elevated, 1.2  MCV elevated     Denies symptoms of hepatic decompensation including jaundice, ascites, cognitive problems to suggest hepatic encephalopathy, or GI bleeding.     Still drinking alcohol every day though feels that he has decreased the amount. Acknowledges need to stop.           Past medical history, surgical history, problem list, family history, social history, allergies: Reviewed and updated in the appropriate section of the electronic medical record.      No current outpatient medications on file.     No current facility-administered medications for this visit.     Medication list reviewed and updated.      Review of Systems   As per HPI      Physical Exam   Constitutional: Well-nourished. No  "distress. Alert and oriented.  Eyes: No scleral icterus.   Pulmonary/Chest: Respiratory effort normal. No respiratory distress.   Abdominal: No distension, no ascites appreciated.   Extremities: No edema.   Neurological: No tremor or asterixis. Gait normal.  Skin: No jaundice. No spider telangiectasias. +palmar erythema.  Psychiatric: Normal mood and affect. Speech, behavior, and thought content normal. No depression or anxiety noted.       Vitals reviewed.  /82 (BP Location: Right arm, Patient Position: Sitting, BP Method: Medium (Automatic))   Pulse 71   Temp 98.4 °F (36.9 °C) (Oral)   Resp 18   Ht 5' 4" (1.626 m)   Wt 66.2 kg (145 lb 15.1 oz)   SpO2 98%   BMI 25.05 kg/m²       LABS & DIAGNOSTIC STUDIES     I have personally reviewed pertinent laboratory findings:    Lab Results   Component Value Date     (H) 11/22/2022     (H) 11/22/2022    ALKPHOS 96 11/22/2022    BILITOT 1.2 (H) 11/22/2022    ALBUMIN 4.6 11/22/2022    INR 1.1 11/22/2022       Lab Results   Component Value Date    WBC 6.14 11/22/2022    HGB 17.0 11/22/2022    HCT 50.4 11/22/2022     (H) 11/22/2022     11/22/2022       Lab Results   Component Value Date     11/22/2022    K 4.3 11/22/2022    BUN 6 11/22/2022    CREATININE 0.9 11/22/2022    ESTGFRAFRICA >60.0 04/12/2022    EGFRNONAA >60.0 04/12/2022       Lab Results   Component Value Date    SMOOTHMUSCAB Negative 1:40 05/23/2019    AMAIFA Negative 1:40 05/23/2019    IGGSERUM 1023 05/23/2019    ANASCREEN Negative <1:160 05/23/2019    FERRITIN 1,269 (H) 03/18/2014    FESATURATED 26 03/18/2014    PETH 901 (A) 06/09/2020    VBNHT3KNWTJL MM 03/18/2014    XLKQC3GHAWRA 133 03/18/2014    CERULOPLSM 19.0 03/18/2014    HEPBSAG Negative 05/23/2019    HEPBIGM Negative 03/06/2014    HEPBCAB Negative 05/23/2019    HEPCAB Negative 05/23/2019    HEPAIGM Negative 03/06/2014       Lab Results   Component Value Date    AFP 5.0 11/22/2022       I have personally reviewed " the following result reports:  Abdominal US - 11/22/22      ASSESSMENT & PLAN     46 y.o. male with:    1. Cirrhosis (based on Fibroscan today) due to alcohol, well compensated   -- Transaminases remain elevated, due to continued daily alcohol use. TBili has also increased. Will continue monitoring LFTs every 3 months right now.  -- Fibroscan is now suggestive of cirrhosis. Unclear if results are accurate as alcohol/hepatic inflammation may be skewing this. Discussed that labs show over 10 years of liver inflammation so he may have advanced fibrosis.   -- Continue HCC screening every 6 months with ultrasound and AFP, next due 5/2023  -- Platelet count is normal and no evidence of portal HTN. Fibroscan kPa approaching 20. May need EGD to screen for varices though will defer until next visit. He would like to repeat Fibroscan in ~3 months.     MELD-Na score: 8 at 11/22/2022  9:18 AM  MELD score: 8 at 11/22/2022  9:18 AM  Calculated from:  Serum Creatinine: 0.9 mg/dL (Using min of 1 mg/dL) at 11/22/2022  9:18 AM  Serum Sodium: 140 mmol/L (Using max of 137 mmol/L) at 11/22/2022  9:18 AM  Total Bilirubin: 1.2 mg/dL at 11/22/2022  9:18 AM  INR(ratio): 1.1 at 11/22/2022  9:18 AM  Age: 46 years      2. Daily alcohol use  -- Discussed risks of continued alcohol use including alcoholic hepatitis, symptoms of decompensation, liver failure, and death. He is aware that he would not be a transplant candidate (if ever needed) with continued alcohol use.   -- Strongly advised tapering alcohol to abstinence. Discussed that this includes wine, beer, and liquor.   -- He acknowledges the need to stop drinking. Denies need for resources or clinic visit with psychiatry at this time       Orders Placed This Encounter   Procedures    FibroScan [place] (Vibration Controlled Transient Elastography)    US Abdomen Limited    CBC Without Differential    Comprehensive Metabolic Panel    Protime-INR    AFP Tumor Marker    Phosphatidylethanol  (PETH)    Comprehensive Metabolic Panel    Protime-INR    Phosphatidylethanol (PETH)       *See AVS for patient education and instructions.      RTC in 3 months with labs and Fibroscan.      Thank you for allowing me to participate in the care of Lance Ibarraedmund Grande, FNP-C  Hepatology        Duration of encounter: 30 min  This includes face to face time and non-face to face time preparing to see the patient (eg, review of tests), obtaining and/or reviewing separately obtained history, documenting clinical information in the electronic or other health record, independently interpreting results and communicating results to the patient/family/caregiver, or care coordination.

## 2022-11-29 NOTE — PROCEDURES
FibroScan (Vibration Controlled Transient Elastography)    Date/Time: 11/29/2022 9:45 AM  Performed by: Roro Grande NP  Authorized by: Roro Grande NP     Diagnosis:  Alcohol    Probe:  M    Universal Protocol: Patient's identity, procedure and site were verified, confirmatory pause was performed.  Discussed procedure including risks and potential complications.  Questions answered.  Patient verbalizes understanding and wishes to proceed with VCTE.     Procedure: After providing explanations of the procedure, patient was placed in the supine position with right arm in maximum abduction to allow optimal exposure of right lateral abdomen.  Patient was briefly assessed, Testing was performed in the mid-axillary location, 50Hz Shear Wave pulses were applied and the resulting Shear Wave and Propagation Speed detected with a 3.5 MHz ultrasonic signal, using the FibroScan probe, Skin to liver capsule distance and liver parenchyma were accessed during the entire examination with the FibroScan probe, Patient was instructed to breathe normally and to abstain from sudden movements during the procedure, allowing for random measurements of liver stiffness. At least 10 Shear Waves were produced, Individual measurements of each Shear Wave were calculated.  Patient tolerated the procedure well with no complications.  Meets discharge criteria as was dismissed.  Rates pain 0 out of 10.  Patient will follow up with ordering provider to review results.    Findings  Median liver stiffness score:  19.7  CAP Reading: dB/m:  268    IQR/med %:  9  Interpretation  Fibrosis interpretation is based on medial liver stiffness - Kilopascal (kPa).    Fibrosis Stage:  F4  Steatosis interpretation is based on controlled attenuation parameter - (dB/m).    Steatosis Grade:  S2

## 2023-02-27 ENCOUNTER — PATIENT MESSAGE (OUTPATIENT)
Dept: RESEARCH | Facility: HOSPITAL | Age: 48
End: 2023-02-27
Payer: COMMERCIAL

## 2023-02-27 ENCOUNTER — PATIENT MESSAGE (OUTPATIENT)
Dept: HEPATOLOGY | Facility: CLINIC | Age: 48
End: 2023-02-27
Payer: COMMERCIAL

## 2023-05-31 ENCOUNTER — PATIENT OUTREACH (OUTPATIENT)
Dept: ADMINISTRATIVE | Facility: HOSPITAL | Age: 48
End: 2023-05-31
Payer: COMMERCIAL

## 2023-05-31 DIAGNOSIS — R73.03 PREDIABETES: ICD-10-CM

## 2023-05-31 DIAGNOSIS — Z00.00 ROUTINE HEALTH MAINTENANCE: ICD-10-CM

## 2023-05-31 DIAGNOSIS — K74.00 LIVER FIBROSIS: Primary | ICD-10-CM

## 2023-06-14 ENCOUNTER — OFFICE VISIT (OUTPATIENT)
Dept: INTERNAL MEDICINE | Facility: CLINIC | Age: 48
End: 2023-06-14
Payer: COMMERCIAL

## 2023-06-14 ENCOUNTER — LAB VISIT (OUTPATIENT)
Dept: LAB | Facility: OTHER | Age: 48
End: 2023-06-14
Attending: FAMILY MEDICINE
Payer: COMMERCIAL

## 2023-06-14 VITALS
SYSTOLIC BLOOD PRESSURE: 120 MMHG | OXYGEN SATURATION: 99 % | HEIGHT: 64 IN | HEART RATE: 89 BPM | WEIGHT: 145.94 LBS | BODY MASS INDEX: 24.92 KG/M2 | DIASTOLIC BLOOD PRESSURE: 82 MMHG

## 2023-06-14 DIAGNOSIS — F10.10 ALCOHOL ABUSE: ICD-10-CM

## 2023-06-14 DIAGNOSIS — E78.5 HYPERLIPIDEMIA, UNSPECIFIED HYPERLIPIDEMIA TYPE: ICD-10-CM

## 2023-06-14 DIAGNOSIS — R73.03 PREDIABETES: ICD-10-CM

## 2023-06-14 DIAGNOSIS — K74.00 LIVER FIBROSIS: ICD-10-CM

## 2023-06-14 DIAGNOSIS — Z00.00 ROUTINE HEALTH MAINTENANCE: ICD-10-CM

## 2023-06-14 DIAGNOSIS — K70.30 ALCOHOLIC CIRRHOSIS OF LIVER WITHOUT ASCITES: ICD-10-CM

## 2023-06-14 DIAGNOSIS — Z00.00 ROUTINE HEALTH MAINTENANCE: Primary | ICD-10-CM

## 2023-06-14 LAB
ALBUMIN SERPL BCP-MCNC: 4.6 G/DL (ref 3.5–5.2)
ALP SERPL-CCNC: 103 U/L (ref 55–135)
ALT SERPL W/O P-5'-P-CCNC: 154 U/L (ref 10–44)
ANION GAP SERPL CALC-SCNC: 13 MMOL/L (ref 8–16)
AST SERPL-CCNC: 187 U/L (ref 10–40)
BASOPHILS # BLD AUTO: 0.04 K/UL (ref 0–0.2)
BASOPHILS NFR BLD: 0.6 % (ref 0–1.9)
BILIRUB SERPL-MCNC: 1.6 MG/DL (ref 0.1–1)
BUN SERPL-MCNC: 7 MG/DL (ref 6–20)
CALCIUM SERPL-MCNC: 10.1 MG/DL (ref 8.7–10.5)
CHLORIDE SERPL-SCNC: 100 MMOL/L (ref 95–110)
CHOLEST SERPL-MCNC: 303 MG/DL (ref 120–199)
CHOLEST/HDLC SERPL: 4 {RATIO} (ref 2–5)
CO2 SERPL-SCNC: 25 MMOL/L (ref 23–29)
CREAT SERPL-MCNC: 0.8 MG/DL (ref 0.5–1.4)
DIFFERENTIAL METHOD: ABNORMAL
EOSINOPHIL # BLD AUTO: 0.4 K/UL (ref 0–0.5)
EOSINOPHIL NFR BLD: 5.1 % (ref 0–8)
ERYTHROCYTE [DISTWIDTH] IN BLOOD BY AUTOMATED COUNT: 13 % (ref 11.5–14.5)
EST. GFR  (NO RACE VARIABLE): >60 ML/MIN/1.73 M^2
ESTIMATED AVG GLUCOSE: 91 MG/DL (ref 68–131)
GLUCOSE SERPL-MCNC: 83 MG/DL (ref 70–110)
HBA1C MFR BLD: 4.8 % (ref 4–5.6)
HCT VFR BLD AUTO: 45.8 % (ref 40–54)
HDLC SERPL-MCNC: 76 MG/DL (ref 40–75)
HDLC SERPL: 25.1 % (ref 20–50)
HGB BLD-MCNC: 15.5 G/DL (ref 14–18)
IMM GRANULOCYTES # BLD AUTO: 0.02 K/UL (ref 0–0.04)
IMM GRANULOCYTES NFR BLD AUTO: 0.3 % (ref 0–0.5)
LDLC SERPL CALC-MCNC: 207.8 MG/DL (ref 63–159)
LYMPHOCYTES # BLD AUTO: 1.3 K/UL (ref 1–4.8)
LYMPHOCYTES NFR BLD: 19.6 % (ref 18–48)
MCH RBC QN AUTO: 34.3 PG (ref 27–31)
MCHC RBC AUTO-ENTMCNC: 33.8 G/DL (ref 32–36)
MCV RBC AUTO: 101 FL (ref 82–98)
MONOCYTES # BLD AUTO: 0.8 K/UL (ref 0.3–1)
MONOCYTES NFR BLD: 11.9 % (ref 4–15)
NEUTROPHILS # BLD AUTO: 4.3 K/UL (ref 1.8–7.7)
NEUTROPHILS NFR BLD: 62.5 % (ref 38–73)
NONHDLC SERPL-MCNC: 227 MG/DL
NRBC BLD-RTO: 0 /100 WBC
PLATELET # BLD AUTO: 297 K/UL (ref 150–450)
PMV BLD AUTO: 9.8 FL (ref 9.2–12.9)
POTASSIUM SERPL-SCNC: 4.8 MMOL/L (ref 3.5–5.1)
PROT SERPL-MCNC: 8.3 G/DL (ref 6–8.4)
RBC # BLD AUTO: 4.52 M/UL (ref 4.6–6.2)
SODIUM SERPL-SCNC: 138 MMOL/L (ref 136–145)
TRIGL SERPL-MCNC: 96 MG/DL (ref 30–150)
WBC # BLD AUTO: 6.82 K/UL (ref 3.9–12.7)

## 2023-06-14 PROCEDURE — 3074F PR MOST RECENT SYSTOLIC BLOOD PRESSURE < 130 MM HG: ICD-10-PCS | Mod: CPTII,S$GLB,, | Performed by: FAMILY MEDICINE

## 2023-06-14 PROCEDURE — 85025 COMPLETE CBC W/AUTO DIFF WBC: CPT | Performed by: FAMILY MEDICINE

## 2023-06-14 PROCEDURE — 3079F DIAST BP 80-89 MM HG: CPT | Mod: CPTII,S$GLB,, | Performed by: FAMILY MEDICINE

## 2023-06-14 PROCEDURE — 99999 PR PBB SHADOW E&M-EST. PATIENT-LVL III: ICD-10-PCS | Mod: PBBFAC,,, | Performed by: FAMILY MEDICINE

## 2023-06-14 PROCEDURE — 83036 HEMOGLOBIN GLYCOSYLATED A1C: CPT | Performed by: FAMILY MEDICINE

## 2023-06-14 PROCEDURE — 80061 LIPID PANEL: CPT | Performed by: FAMILY MEDICINE

## 2023-06-14 PROCEDURE — 80053 COMPREHEN METABOLIC PANEL: CPT | Performed by: FAMILY MEDICINE

## 2023-06-14 PROCEDURE — 3074F SYST BP LT 130 MM HG: CPT | Mod: CPTII,S$GLB,, | Performed by: FAMILY MEDICINE

## 2023-06-14 PROCEDURE — 3008F BODY MASS INDEX DOCD: CPT | Mod: CPTII,S$GLB,, | Performed by: FAMILY MEDICINE

## 2023-06-14 PROCEDURE — 99999 PR PBB SHADOW E&M-EST. PATIENT-LVL III: CPT | Mod: PBBFAC,,, | Performed by: FAMILY MEDICINE

## 2023-06-14 PROCEDURE — 99396 PREV VISIT EST AGE 40-64: CPT | Mod: S$GLB,,, | Performed by: FAMILY MEDICINE

## 2023-06-14 PROCEDURE — 36415 COLL VENOUS BLD VENIPUNCTURE: CPT | Performed by: FAMILY MEDICINE

## 2023-06-14 PROCEDURE — 3079F PR MOST RECENT DIASTOLIC BLOOD PRESSURE 80-89 MM HG: ICD-10-PCS | Mod: CPTII,S$GLB,, | Performed by: FAMILY MEDICINE

## 2023-06-14 PROCEDURE — 3008F PR BODY MASS INDEX (BMI) DOCUMENTED: ICD-10-PCS | Mod: CPTII,S$GLB,, | Performed by: FAMILY MEDICINE

## 2023-06-14 PROCEDURE — 99396 PR PREVENTIVE VISIT,EST,40-64: ICD-10-PCS | Mod: S$GLB,,, | Performed by: FAMILY MEDICINE

## 2023-06-14 RX ORDER — NALTREXONE HYDROCHLORIDE 50 MG/1
50 TABLET, FILM COATED ORAL DAILY
Qty: 30 TABLET | Refills: 2 | Status: SHIPPED | OUTPATIENT
Start: 2023-06-14 | End: 2023-07-14

## 2023-06-14 NOTE — PROGRESS NOTES
"Subjective:       Patient ID: Lance Hampton is a 47 y.o. male.    Chief Complaint: Annual Exam    HPI  Came in today for annual exam.   Continues to drink alcohol routinely. Is open to help with medication.    I advised him to collaborate with wife and stop drinking bottle of wine together every night. Un-subscribe from wine club. Consider alternatives for alcohol: sparkling water, kombucha  He seems somewhat motivated.  Social situations continue to make this challenging.    All of your core healthy metrics are met.      Social History     Social History Narrative    Rates diet as fair.      He is not satisfied with weight.    He does drink at least 1/2 gallon water daily.    He drinks 1 coffee/tea/caffeine-containing soft drinks daily.    Total sleep time at night is 8 hours.    He works 40-50 hours per week.    He does wear seat belts.    Hobbies include golf, foodie.               Family History   Problem Relation Age of Onset    Alcohol abuse Father     Cancer Maternal Grandfather         prostate cancer    Alcohol abuse Paternal Grandfather     Cirrhosis Paternal Grandfather        Current Outpatient Medications:     naltrexone (DEPADE) 50 mg tablet, Take 50 mg by mouth once daily., Disp: 30 tablet, Rfl: 2    Review of Systems   Constitutional:  Negative for chills and fever.   Eyes:  Negative for visual disturbance.   Respiratory:  Negative for cough and shortness of breath.    Cardiovascular:  Negative for chest pain.   Gastrointestinal:  Negative for abdominal pain.   Neurological:  Negative for dizziness.     Objective:   /82 (BP Location: Right arm, Patient Position: Sitting, BP Method: Large (Manual))   Pulse 89   Ht 5' 4" (1.626 m)   Wt 66.2 kg (145 lb 15.1 oz)   SpO2 99%   BMI 25.05 kg/m²      Physical Exam  Vitals reviewed.   Constitutional:       Appearance: He is well-developed.   HENT:      Head: Normocephalic and atraumatic.   Eyes:      Conjunctiva/sclera: Conjunctivae " normal.   Cardiovascular:      Rate and Rhythm: Normal rate.   Pulmonary:      Effort: Pulmonary effort is normal. No respiratory distress.   Skin:     General: Skin is warm and dry.      Findings: No rash.   Neurological:      Mental Status: He is alert and oriented to person, place, and time.      Coordination: Coordination normal.   Psychiatric:         Behavior: Behavior normal.       Assessment & Plan     Problem List Items Addressed This Visit          Psychiatric    Alcohol abuse    Current Assessment & Plan     Will do trial of naltrexone.   This continues to be main health hazard.             Cardiac/Vascular    Hyperlipidemia    Current Assessment & Plan     Poor statin candidate secondary to elevated AST/ALT            GI    Cirrhosis of liver without ascites    Current Assessment & Plan     Due for f/u with hepatology            Other    Routine health maintenance - Primary    Current Assessment & Plan     Getting f/u labs.   Alcohol cessation or at least reduction is necessary              Immunizations Administered on Date of Encounter - 6/14/2023       No immunizations on file.             No follow-ups on file.      Disclaimer:  This note may have been prepared using voice recognition software, it may have not been extensively proofed, as such there could be errors within the text such as sound alike errors.

## 2023-06-19 ENCOUNTER — TELEPHONE (OUTPATIENT)
Dept: HEPATOLOGY | Facility: CLINIC | Age: 48
End: 2023-06-19
Payer: COMMERCIAL

## 2023-06-19 DIAGNOSIS — K74.60 CIRRHOSIS OF LIVER WITHOUT ASCITES, UNSPECIFIED HEPATIC CIRRHOSIS TYPE: Primary | ICD-10-CM

## 2023-06-19 NOTE — PROGRESS NOTES
Cynthia Read, He is still having challenges with alcohol. Recently started on trial of naltrexone.   Would you be okay with me starting him on statin? If so what monitoring frequency would you suggest?

## 2023-06-19 NOTE — TELEPHONE ENCOUNTER
Left a voice message for the pt to call back to get scheduled  Please schedule f/u visit in clinic with labs and ultrasound prior. Thanks!

## 2023-06-27 ENCOUNTER — TELEPHONE (OUTPATIENT)
Dept: INTERNAL MEDICINE | Facility: CLINIC | Age: 48
End: 2023-06-27
Payer: COMMERCIAL

## 2023-06-27 ENCOUNTER — PATIENT MESSAGE (OUTPATIENT)
Dept: INTERNAL MEDICINE | Facility: CLINIC | Age: 48
End: 2023-06-27
Payer: COMMERCIAL

## 2023-06-27 DIAGNOSIS — E78.5 HYPERLIPIDEMIA, UNSPECIFIED HYPERLIPIDEMIA TYPE: Primary | ICD-10-CM

## 2023-06-27 RX ORDER — ATORVASTATIN CALCIUM 20 MG/1
20 TABLET, FILM COATED ORAL DAILY
Qty: 90 TABLET | Refills: 3 | Status: SHIPPED | OUTPATIENT
Start: 2023-06-27 | End: 2024-02-21 | Stop reason: ALTCHOICE

## 2023-06-27 NOTE — TELEPHONE ENCOUNTER
Left voice message for Mr. Hampton to call  . Need to discuss the listed message and  schedule future lab. Will also send through My Chart      From Dr. Garcia  Discussed with hepatology and they are okay with starting on statin to help with cholesterol.   Will need to repeat liver numbers after 6 weeks to be sure doing okay on statin.

## 2023-06-27 NOTE — TELEPHONE ENCOUNTER
Discussed with hepatology and they are okay with starting on statin to help with cholesterol.   Will need to repeat liver numbers after 6 weeks to be sure doing okay on statin.

## 2023-06-27 NOTE — TELEPHONE ENCOUNTER
----- Message from [place] NP sent at 6/19/2023 11:49 AM CDT -----  Hi,    That's a shame. Hopefully the naltrexone will help. I think the statin is fine; it should help from a fatty liver standpoint. I think we can watch his labs every 3 months right now though maybe checking ~6 weeks after starting the statin? Looks like he may be having some liver dysfunction as his bilirubin is trending up. I'll make sure our staff reaches out to schedule follow-up in our clinic too.    Thanks!  Roro    ----- Message -----  From: Neo Garcia DO  Sent: 6/18/2023   9:48 PM CDT  To: LACIE Corcorany Roro, He is still having challenges with alcohol. Recently started on trial of naltrexone.   Would you be okay with me starting him on statin? If so what monitoring frequency would you suggest?

## 2023-09-18 PROBLEM — Z00.00 ROUTINE HEALTH MAINTENANCE: Status: RESOLVED | Noted: 2022-04-12 | Resolved: 2023-09-18

## 2024-02-21 ENCOUNTER — OFFICE VISIT (OUTPATIENT)
Dept: URGENT CARE | Facility: CLINIC | Age: 49
End: 2024-02-21
Payer: COMMERCIAL

## 2024-02-21 ENCOUNTER — TELEPHONE (OUTPATIENT)
Dept: URGENT CARE | Facility: CLINIC | Age: 49
End: 2024-02-21

## 2024-02-21 VITALS
OXYGEN SATURATION: 98 % | BODY MASS INDEX: 25.61 KG/M2 | HEIGHT: 64 IN | TEMPERATURE: 99 F | SYSTOLIC BLOOD PRESSURE: 129 MMHG | WEIGHT: 150 LBS | DIASTOLIC BLOOD PRESSURE: 79 MMHG | RESPIRATION RATE: 18 BRPM | HEART RATE: 75 BPM

## 2024-02-21 DIAGNOSIS — F10.90 ALCOHOL USE DISORDER: ICD-10-CM

## 2024-02-21 DIAGNOSIS — K70.30 ALCOHOLIC CIRRHOSIS OF LIVER WITHOUT ASCITES: ICD-10-CM

## 2024-02-21 DIAGNOSIS — R53.83 FATIGUE, UNSPECIFIED TYPE: ICD-10-CM

## 2024-02-21 DIAGNOSIS — R68.83 CHILLS: Primary | ICD-10-CM

## 2024-02-21 LAB
ALBUMIN SERPL BCP-MCNC: 4.2 G/DL (ref 3.5–5.2)
ALP SERPL-CCNC: 96 U/L (ref 55–135)
ALT SERPL W/O P-5'-P-CCNC: 144 U/L (ref 10–44)
ANION GAP SERPL CALC-SCNC: 11 MMOL/L (ref 8–16)
AST SERPL-CCNC: 109 U/L (ref 10–40)
BASOPHILS # BLD AUTO: 0.05 K/UL (ref 0–0.2)
BASOPHILS NFR BLD: 0.8 % (ref 0–1.9)
BILIRUB SERPL-MCNC: 1.9 MG/DL (ref 0.1–1)
BILIRUB UR QL STRIP: NEGATIVE
BUN SERPL-MCNC: 9 MG/DL (ref 6–20)
CALCIUM SERPL-MCNC: 9.8 MG/DL (ref 8.7–10.5)
CHLORIDE SERPL-SCNC: 100 MMOL/L (ref 95–110)
CO2 SERPL-SCNC: 23 MMOL/L (ref 23–29)
CREAT SERPL-MCNC: 0.8 MG/DL (ref 0.5–1.4)
CTP QC/QA: YES
DIFFERENTIAL METHOD BLD: ABNORMAL
EOSINOPHIL # BLD AUTO: 0.3 K/UL (ref 0–0.5)
EOSINOPHIL NFR BLD: 4 % (ref 0–8)
ERYTHROCYTE [DISTWIDTH] IN BLOOD BY AUTOMATED COUNT: 12.9 % (ref 11.5–14.5)
EST. GFR  (NO RACE VARIABLE): >60 ML/MIN/1.73 M^2
GLUCOSE SERPL-MCNC: 92 MG/DL (ref 70–110)
GLUCOSE UR QL STRIP: NEGATIVE
HCT VFR BLD AUTO: 44.9 % (ref 40–54)
HGB BLD-MCNC: 15 G/DL (ref 14–18)
IMM GRANULOCYTES # BLD AUTO: 0.01 K/UL (ref 0–0.04)
IMM GRANULOCYTES NFR BLD AUTO: 0.2 % (ref 0–0.5)
KETONES UR QL STRIP: POSITIVE
LEUKOCYTE ESTERASE UR QL STRIP: NEGATIVE
LYMPHOCYTES # BLD AUTO: 1 K/UL (ref 1–4.8)
LYMPHOCYTES NFR BLD: 15.8 % (ref 18–48)
MCH RBC QN AUTO: 34.2 PG (ref 27–31)
MCHC RBC AUTO-ENTMCNC: 33.4 G/DL (ref 32–36)
MCV RBC AUTO: 103 FL (ref 82–98)
MONOCYTES # BLD AUTO: 0.9 K/UL (ref 0.3–1)
MONOCYTES NFR BLD: 13.7 % (ref 4–15)
NEUTROPHILS # BLD AUTO: 4.1 K/UL (ref 1.8–7.7)
NEUTROPHILS NFR BLD: 65.5 % (ref 38–73)
NRBC BLD-RTO: 0 /100 WBC
PH, POC UA: 8 (ref 5–8)
PLATELET # BLD AUTO: 258 K/UL (ref 150–450)
PMV BLD AUTO: 11 FL (ref 9.2–12.9)
POC BLOOD, URINE: NEGATIVE
POC MOLECULAR INFLUENZA A AGN: NEGATIVE
POC MOLECULAR INFLUENZA B AGN: NEGATIVE
POC NITRATES, URINE: NEGATIVE
POTASSIUM SERPL-SCNC: 4.4 MMOL/L (ref 3.5–5.1)
PROT SERPL-MCNC: 7.8 G/DL (ref 6–8.4)
PROT UR QL STRIP: NEGATIVE
RBC # BLD AUTO: 4.38 M/UL (ref 4.6–6.2)
SODIUM SERPL-SCNC: 134 MMOL/L (ref 136–145)
SP GR UR STRIP: 1 (ref 1–1.03)
UROBILINOGEN UR STRIP-ACNC: ABNORMAL (ref 0.3–2.2)
WBC # BLD AUTO: 6.28 K/UL (ref 3.9–12.7)

## 2024-02-21 PROCEDURE — 87502 INFLUENZA DNA AMP PROBE: CPT | Mod: QW,S$GLB,, | Performed by: FAMILY MEDICINE

## 2024-02-21 PROCEDURE — 99214 OFFICE O/P EST MOD 30 MIN: CPT | Mod: S$GLB,,, | Performed by: FAMILY MEDICINE

## 2024-02-21 PROCEDURE — 80053 COMPREHEN METABOLIC PANEL: CPT | Performed by: FAMILY MEDICINE

## 2024-02-21 PROCEDURE — 36415 COLL VENOUS BLD VENIPUNCTURE: CPT | Performed by: FAMILY MEDICINE

## 2024-02-21 PROCEDURE — 85025 COMPLETE CBC W/AUTO DIFF WBC: CPT | Performed by: FAMILY MEDICINE

## 2024-02-21 PROCEDURE — 81003 URINALYSIS AUTO W/O SCOPE: CPT | Mod: QW,S$GLB,, | Performed by: FAMILY MEDICINE

## 2024-02-21 NOTE — TELEPHONE ENCOUNTER
I phoned pt to review instructions. I am worried this illness is a result of liver problems and his drinking again. I recommend he make an appointment with the liver specialist and his PCP and I reviewed that I referred him to an addiction specialist and I strongly recommend he quit drinking. All questions answered at this time. We will be back in touch with his labs

## 2024-02-21 NOTE — PROGRESS NOTES
"Subjective:      Patient ID: Lance Hampton is a 48 y.o. male.    Vitals:  height is 5' 4" (1.626 m) and weight is 68 kg (150 lb). His oral temperature is 98.8 °F (37.1 °C). His blood pressure is 129/79 and his pulse is 75. His respiration is 18 and oxygen saturation is 98%.     Chief Complaint: Cough    Patient present with chills, fever, body aches that started on yesterday patient took tylenol for body aches. He admits to some abdominal pains as well and alternating loose stools and then constipation. He feels his urine is darker and he thinks he might be a bit dehydrated. He denies nausea or vomiting. He had a negative covid this morning. He also states he was drinking more over MG -but has cut back some since then. He had quit for 4 and 1/2 years but is drinking again.    Cough  This is a new problem. The current episode started yesterday. The problem has been gradually worsening. The problem occurs constantly. The cough is Non-productive. Associated symptoms include chills, a fever, headaches, nasal congestion, postnasal drip, a sore throat and wheezing. Pertinent negatives include no chest pain, ear congestion, ear pain, heartburn, hemoptysis, myalgias, rash, rhinorrhea, shortness of breath, sweats or weight loss. Nothing aggravates the symptoms.       Constitution: Positive for chills and fever.   HENT:  Positive for postnasal drip and sore throat. Negative for ear pain, sinus pain and sinus pressure.    Cardiovascular:  Negative for chest pain.   Respiratory:  Positive for cough and wheezing. Negative for bloody sputum and shortness of breath.    Gastrointestinal:  Negative for heartburn.   Musculoskeletal:  Negative for muscle ache.   Skin:  Negative for rash.   Neurological:  Positive for headaches. Negative for dizziness and light-headedness.      Objective:     Physical Exam   Constitutional: He is oriented to person, place, and time. He appears well-developed. He is cooperative.  Non-toxic " appearance. He does not appear ill. No distress.   HENT:   Head: Normocephalic and atraumatic.   Ears:   Right Ear: Hearing, tympanic membrane, external ear and ear canal normal.   Left Ear: Hearing, tympanic membrane, external ear and ear canal normal.   Nose: Nose normal. No mucosal edema, rhinorrhea or nasal deformity. No epistaxis. Right sinus exhibits no maxillary sinus tenderness and no frontal sinus tenderness. Left sinus exhibits no maxillary sinus tenderness and no frontal sinus tenderness.   Mouth/Throat: Uvula is midline and oropharynx is clear and moist. Mucous membranes are dry. No trismus in the jaw. Normal dentition. No uvula swelling. No oropharyngeal exudate, posterior oropharyngeal edema or posterior oropharyngeal erythema.   Eyes: Conjunctivae and lids are normal. No scleral icterus.   Neck: Trachea normal and phonation normal. Neck supple. No edema present. No erythema present. No neck rigidity present.   Cardiovascular: Normal rate, regular rhythm, normal heart sounds and normal pulses.   Pulmonary/Chest: Effort normal and breath sounds normal. No respiratory distress. He has no decreased breath sounds. He has no rhonchi.   Abdominal: Normal appearance. Soft. flat abdomen      Comments: Mildly active BS, mild diffuse tenderness   Musculoskeletal: Normal range of motion.         General: No deformity or edema. Normal range of motion.   Lymphadenopathy:     He has no cervical adenopathy.   Neurological: He is alert and oriented to person, place, and time. He exhibits normal muscle tone. Coordination normal.   Skin: Skin is warm, dry, intact, not diaphoretic and not pale.   Psychiatric: His speech is normal and behavior is normal. Judgment and thought content normal.   Nursing note and vitals reviewed.      Assessment:     1. Chills    2. Alcoholic cirrhosis of liver without ascites    3. Fatigue, unspecified type    4. Alcohol use disorder        Plan:       Chills  -     POCT Influenza A/B  Molecular  -     CBC Auto Differential  -     COMPREHENSIVE METABOLIC PANEL  -     Phosphatidylethanol (PETH)  -     POCT Urinalysis, Dipstick, Automated, W/O Scope    Alcoholic cirrhosis of liver without ascites  -     CBC Auto Differential  -     COMPREHENSIVE METABOLIC PANEL  -     Phosphatidylethanol (PETH)  -     Ambulatory referral/consult to Addiction Specialist    Fatigue, unspecified type    Alcohol use disorder  -     Ambulatory referral/consult to Addiction Specialist      Pt or guardian provided educational materials and instructions regarding their visit diagnosis.

## 2024-02-25 ENCOUNTER — TELEPHONE (OUTPATIENT)
Dept: URGENT CARE | Facility: CLINIC | Age: 49
End: 2024-02-25
Payer: COMMERCIAL

## 2024-02-25 NOTE — TELEPHONE ENCOUNTER
I called and left a message on pts cell that his labs were mostly OK but his liver function tests are elevated indicating strain on his liver which is very serious given his cirrhosis history. I recommend he call the liver specialist and follow with his PCP. He is welcome to call back with any questions or return if he is feeling worse. I wish him well with quitting drinking

## 2024-03-03 ENCOUNTER — TELEPHONE (OUTPATIENT)
Dept: URGENT CARE | Facility: CLINIC | Age: 49
End: 2024-03-03
Payer: COMMERCIAL

## 2024-03-03 NOTE — TELEPHONE ENCOUNTER
I called patient to let him know that the specimen for peth test never made it to the lab. I spoke to Dr. Dawn who suggests having this test repeated with the specialist. Patient did not answer phone. I left a voicemail. [email or web address]

## 2024-04-02 ENCOUNTER — TELEPHONE (OUTPATIENT)
Dept: HEPATOLOGY | Facility: CLINIC | Age: 49
End: 2024-04-02
Payer: COMMERCIAL

## 2024-04-02 NOTE — TELEPHONE ENCOUNTER
Pt was called no answer. LVM asking pt to call back so we can get him schld for the requested appt.    Jaz

## 2024-04-08 ENCOUNTER — TELEPHONE (OUTPATIENT)
Dept: HEPATOLOGY | Facility: CLINIC | Age: 49
End: 2024-04-08
Payer: COMMERCIAL

## 2024-04-08 NOTE — TELEPHONE ENCOUNTER
----- Message from Tootie Ho MA sent at 4/4/2024  3:28 PM CDT -----  Regarding: FW: Returning a Missed Call    ----- Message -----  From: Samantha Alvarado  Sent: 4/2/2024   4:38 PM CDT  To: [facility] Scheduling  Subject: Returning a Missed Call                              Caller:   Lance      Returning call to:   Jaz Odom       Caller can be reached @:   [phone]      Nature of the call:   Pt would like to schedule some appts.    Pt was called and schld with Todd for a sooner appt.  Jaz

## 2024-05-09 ENCOUNTER — TELEPHONE (OUTPATIENT)
Dept: HEPATOLOGY | Facility: CLINIC | Age: 49
End: 2024-05-09
Payer: COMMERCIAL

## 2024-05-09 DIAGNOSIS — K74.60 CIRRHOSIS OF LIVER WITHOUT ASCITES, UNSPECIFIED HEPATIC CIRRHOSIS TYPE: Primary | ICD-10-CM

## 2024-05-09 NOTE — TELEPHONE ENCOUNTER
Patient reached out regarding labs prior to his upcoming appointment on 5/14. He is due for labs and US prior to appointment. Please reach out to patient to get this scheduled at least a 1-2 days before his upcoming appointments so I can have results for 5/14 follow up.    Thank you,  PRIYA Deleon, FNP-C  Nurse Practitioner  [facility]

## 2024-05-10 ENCOUNTER — PATIENT MESSAGE (OUTPATIENT)
Dept: HEPATOLOGY | Facility: CLINIC | Age: 49
End: 2024-05-10
Payer: COMMERCIAL

## 2024-05-10 ENCOUNTER — LAB VISIT (OUTPATIENT)
Dept: LAB | Facility: OTHER | Age: 49
End: 2024-05-10
Attending: NURSE PRACTITIONER
Payer: COMMERCIAL

## 2024-05-10 DIAGNOSIS — K74.60 CIRRHOSIS OF LIVER WITHOUT ASCITES, UNSPECIFIED HEPATIC CIRRHOSIS TYPE: ICD-10-CM

## 2024-05-10 LAB
AFP SERPL-MCNC: 3 NG/ML (ref 0–8.4)
ALBUMIN SERPL BCP-MCNC: 4.2 G/DL (ref 3.5–5.2)
ALP SERPL-CCNC: 79 U/L (ref 55–135)
ALT SERPL W/O P-5'-P-CCNC: 68 U/L (ref 10–44)
ANION GAP SERPL CALC-SCNC: 9 MMOL/L (ref 8–16)
AST SERPL-CCNC: 53 U/L (ref 10–40)
BILIRUB SERPL-MCNC: 0.7 MG/DL (ref 0.1–1)
BUN SERPL-MCNC: 8 MG/DL (ref 6–20)
CALCIUM SERPL-MCNC: 9.7 MG/DL (ref 8.7–10.5)
CHLORIDE SERPL-SCNC: 103 MMOL/L (ref 95–110)
CO2 SERPL-SCNC: 27 MMOL/L (ref 23–29)
CREAT SERPL-MCNC: 0.8 MG/DL (ref 0.5–1.4)
EST. GFR  (NO RACE VARIABLE): >60 ML/MIN/1.73 M^2
GLUCOSE SERPL-MCNC: 101 MG/DL (ref 70–110)
INR PPP: 1 (ref 0.8–1.2)
POTASSIUM SERPL-SCNC: 4.5 MMOL/L (ref 3.5–5.1)
PROT SERPL-MCNC: 7.5 G/DL (ref 6–8.4)
PROTHROMBIN TIME: 11.4 SEC (ref 9–12.5)
SODIUM SERPL-SCNC: 139 MMOL/L (ref 136–145)

## 2024-05-10 PROCEDURE — 36415 COLL VENOUS BLD VENIPUNCTURE: CPT | Performed by: NURSE PRACTITIONER

## 2024-05-10 PROCEDURE — 82105 ALPHA-FETOPROTEIN SERUM: CPT | Performed by: NURSE PRACTITIONER

## 2024-05-10 PROCEDURE — 85610 PROTHROMBIN TIME: CPT | Performed by: NURSE PRACTITIONER

## 2024-05-10 PROCEDURE — 80053 COMPREHEN METABOLIC PANEL: CPT | Performed by: NURSE PRACTITIONER

## 2024-05-10 NOTE — TELEPHONE ENCOUNTER
The patient was contacted to reschedule US.  An appointment has been scheduled on Monday, May 13, 2024 at 8AM.  Patient confirmed.  
no

## 2024-05-10 NOTE — TELEPHONE ENCOUNTER
Called pt for his labs today at 11;30am in [facility].And U/S tomorrow at 2:45 pm.Informed pt for instruction for tomorrow U/S.

## 2024-05-13 ENCOUNTER — HOSPITAL ENCOUNTER (OUTPATIENT)
Dept: RADIOLOGY | Facility: HOSPITAL | Age: 49
Discharge: HOME | End: 2024-05-13
Payer: COMMERCIAL

## 2024-05-13 DIAGNOSIS — K74.60 CIRRHOSIS OF LIVER WITHOUT ASCITES, UNSPECIFIED HEPATIC CIRRHOSIS TYPE: ICD-10-CM

## 2024-05-13 PROCEDURE — 76705 ECHO EXAM OF ABDOMEN: CPT | Mod: TC

## 2024-05-13 PROCEDURE — 76705 ECHO EXAM OF ABDOMEN: CPT | Mod: 26,,, | Performed by: RADIOLOGY

## 2024-05-14 ENCOUNTER — OFFICE VISIT (OUTPATIENT)
Dept: HEPATOLOGY | Facility: CLINIC | Age: 49
End: 2024-05-14
Payer: COMMERCIAL

## 2024-05-14 VITALS — HEIGHT: 64 IN | WEIGHT: 149.25 LBS | BODY MASS INDEX: 25.48 KG/M2

## 2024-05-14 DIAGNOSIS — K76.9 LIVER LESION: ICD-10-CM

## 2024-05-14 DIAGNOSIS — K74.60 CIRRHOSIS OF LIVER WITHOUT ASCITES, UNSPECIFIED HEPATIC CIRRHOSIS TYPE: Primary | ICD-10-CM

## 2024-05-14 DIAGNOSIS — E66.3 OVERWEIGHT (BMI 25.0-29.9): ICD-10-CM

## 2024-05-14 DIAGNOSIS — F10.10 ALCOHOL ABUSE: ICD-10-CM

## 2024-05-14 DIAGNOSIS — F10.90 METABOLIC DYSFUNCTION-ASSOCIATED STEATOTIC LIVER DISEASE AND INCREASED ALCOHOL INTAKE (METALD): ICD-10-CM

## 2024-05-14 DIAGNOSIS — R74.8 ELEVATED LIVER ENZYMES: ICD-10-CM

## 2024-05-14 DIAGNOSIS — K76.0 METABOLIC DYSFUNCTION-ASSOCIATED STEATOTIC LIVER DISEASE AND INCREASED ALCOHOL INTAKE (METALD): ICD-10-CM

## 2024-05-14 PROCEDURE — 1160F RVW MEDS BY RX/DR IN RCRD: CPT | Mod: CPTII,S$GLB,,

## 2024-05-14 PROCEDURE — 99999 PR PBB SHADOW E&M-EST. PATIENT-LVL III: CPT | Mod: PBBFAC,,,

## 2024-05-14 PROCEDURE — 3008F BODY MASS INDEX DOCD: CPT | Mod: CPTII,S$GLB,,

## 2024-05-14 PROCEDURE — 99215 OFFICE O/P EST HI 40 MIN: CPT | Mod: S$GLB,,,

## 2024-05-14 PROCEDURE — 1159F MED LIST DOCD IN RCRD: CPT | Mod: CPTII,S$GLB,,

## 2024-05-14 NOTE — PROGRESS NOTES
[facility] VISIT NEW PT NOTE    REFERRING PROVIDER:  No ref. provider found  PCP: Neo Garcia DO     CHIEF COMPLAINT: ***    HPI: This is a 48 y.o. patient with PMH noted below, presenting for evaluation of Well-compensated cirrhosis due to ***     Diagnosis of cirrhosis based on  *** Biopsy done ***    Previous serologic w/u was negative for *** Florencio's, alpha-1 antitrypsin deficiency, hemochromatosis, autoimmune etiology, and viral hepatitis.     Risk factors for NAFLD include obesity, HTN, HLD, DM    Interval HPI: Presents today alone/with ***  No s/s of hepatic decompensation: no ascites, HE or h/o variceal bleeding  Complications:   1. Ascites *** Diuretics ***  Paracentesis history ***  Low Na diet ***  2. HE ***  Lactulose {and and/or or:79646} Xifaxan ***. Confusion or disorientation ***  3. Varices bleeding *** Beta blocker *** Heart rate at goal? ***   H/o hematemesis or melena ***  Required intubation/blood products in past ***    Abd U/S done *** showed ***    Lab Results   Component Value Date    ALT 68 (H) 05/10/2024    AST 53 (H) 05/10/2024    ALKPHOS 79 05/10/2024    BILITOT 0.7 05/10/2024    ALBUMIN 4.2 05/10/2024    INR 1.0 05/10/2024     02/21/2024       MELD 3.0: 6 at 5/10/2024 10:46 AM  MELD-Na: 6 at 5/10/2024 10:46 AM  Calculated from:  Serum Creatinine: 0.8 mg/dL (Using min of 1 mg/dL) at 5/10/2024 10:46 AM  Serum Sodium: 139 mmol/L (Using max of 137 mmol/L) at 5/10/2024 10:46 AM  Total Bilirubin: 0.7 mg/dL (Using min of 1 mg/dL) at 5/10/2024 10:46 AM  Serum Albumin: 4.2 g/dL (Using max of 3.5 g/dL) at 5/10/2024 10:46 AM  INR(ratio): 1.0 at 5/10/2024 10:46 AM  Age at listing (hypothetical): 48 years  Sex: Male at 5/10/2024 10:46 AM    MELD has been low, indicating no benefit to liver transplant currently***    Cirrhosis Health Maintenance:   -- Last EGD ***  Varices *** Intervention ***. Due for next EGD ***  -- Due for next colonoscopy ***  -- HCC screening   U/S  "*** no lesions, next due ***   AFP *** WNL, next due ***  Lab Results   Component Value Date    AFP 3.0 05/10/2024       -- Immunity to Hep A and B - will check today ***    Denies family history of liver disease ***. Denies current alcohol consumption *** or history of significant alcohol consumption in past ***  Social History     Substance and Sexual Activity   Alcohol Use Yes    Alcohol/week: 10.0 - 12.0 standard drinks of alcohol    Types: 10 - 12 Glasses of wine per week           Allergy and medication list reviewed and updated     PMHX:  has a past medical history of AR (allergic rhinitis), Elevated liver enzymes, Family history of prostate cancer, Fatty liver, GERD (gastroesophageal reflux disease), Hyperlipidemia, Liver fibrosis, and Seizures.    PSHX:  has a past surgical history that includes Cholecystectomy (2013) and Testicle surgery (Left).    FAMILY HISTORY: Updated and reviewed in EPIC    ROS:   GENERAL: Denies fatigue  CARDIOVASCULAR: Denies edema  GI: Denies abdominal pain  SKIN: Denies rash, itching   NEURO: Denies confusion, memory loss, or mood changes    PHYSICAL EXAM:   In no acute distress; alert and oriented to person, place and time  VITALS: Ht 5' 4" (1.626 m)   Wt 67.7 kg (149 lb 4 oz)   BMI 25.62 kg/m²   EYES: Sclerae anicteric  GI: Soft, non-tender, non-distended. No ascites.  EXTREMITIES:  No edema.  SKIN: Warm and dry. No jaundice. No telangectasias noted. No palmar erythema.  NEURO:  No asterixis.  PSYCH:  Thought and speech pattern appropriate. Behavior normal      EDUCATION:  See instructions discussed with patient in Instructions section of the After Visit Summary     ASSESSMENT & PLAN:  48 y.o. male with:  1.  1.  Cirrhosis, due to ***, well compensated  --- cirrhosis diagnosis based on ***  -- MELD 3.0: 6 at 5/10/2024 10:46 AM  MELD-Na: 6 at 5/10/2024 10:46 AM  Calculated from:  Serum Creatinine: 0.8 mg/dL (Using min of 1 mg/dL) at 5/10/2024 10:46 AM  Serum Sodium: 139 mmol/L " (Using max of 137 mmol/L) at 5/10/2024 10:46 AM  Total Bilirubin: 0.7 mg/dL (Using min of 1 mg/dL) at 5/10/2024 10:46 AM  Serum Albumin: 4.2 g/dL (Using max of 3.5 g/dL) at 5/10/2024 10:46 AM  INR(ratio): 1.0 at 5/10/2024 10:46 AM  Age at listing (hypothetical): 48 years  Sex: Male at 5/10/2024 10:46 AM    -- CT/US findings, splenomegaly ***, bilirubin ***, AST > ALT ***, thrombocytopenia - all suggestive of cirrhosis  -- HCC screening: AFP and abd. U/S.. U/S showed ***, AFP *** - both next due ***  -- Immunity to Hep A and B - see HPI  -- EGD last***. Next EGD Due ***, order placed, pt instructed to call and schedule ***  --- Serological workup was ***  -- Labs *** for complete sero w/u ***, MELD labs ***  -- Cirrhosis counseling as noted above and discussed with patient       2. Etiology/cause of cirrhosis  -- liver biopsy done *** showing     3. Fatty liver  -- risk factors for fatty liver: ***  Recommend:  1. Weight loss goal of *** lbs, referral for [facility] ***  2. Low carb/sugar, high fiber and protein diet,, referral to diabetes education for medical nutrition therapy  ***  3. Exercise, 5 days per week, 30 minutes per day, as tolerated  4. Recommend good cholesterol, blood pressure, blood sugar levels   5. Consider enrolling in [facility] fibrosis clinical trails since your fibroscan suggested that you may have fibrosis/scarring in your liver related to fatty liver  ***    3. Portal hypertension   -- EGD on ***, no history of bleeding varices.   -- Ascites {and and/or or:45886} ankle edema   -- Splenomegaly    4. Liver masses ?? ***    5. Alcohol abuse ***  -- Addiction psych ***  Social History     Substance and Sexual Activity   Alcohol Use Yes    Alcohol/week: 10.0 - 12.0 standard drinks of alcohol    Types: 10 - 12 Glasses of wine per week          No follow-ups on file. with *** before  No orders of the defined types were placed in this encounter.       Thank you for allowing me to participate  in the care of Lance HARDY NPPhaniC    I spent a total of *** minutes on the day of the visit.This includes face to face time and non-face to face time preparing to see the patient (eg, review of tests), obtaining and/or reviewing separately obtained history, documenting clinical information in the electronic or other health record, independently interpreting results and communicating results to the patient/family/caregiver, and coordinating care.         CC'ed note to:   No ref. provider found  Neo Garcia, DO

## 2024-05-14 NOTE — PROGRESS NOTES
[facility] - Established Patient    Last Clinic Visit: 11/29/2022    Chief Complaint: Follow-up for fatty liver, elevated liver enzymes, hepatic fibrosis       HISTORY     This is a 48 y.o. male with PMH noted below, here for follow-up of fatty liver, elevated liver enzymes, and hepatic fibrosis. Patient lost to follow up >1 year & is now following up for HCC surveillance.    Fatty liver first noted on abd US in 2013.    His transaminases have been elevated since at least 2011, max 200s. Synthetic liver function WNL. Enzymes have previously improved with a reduction in alcohol use.    Serologic workup has been negative for Florencio's, alpha-1 antitrypsin deficiency, autoimmune etiology, and viral hepatitis.     Ferritin elevated >1200 though iron sat WNL; HH DNA negative for C282Y and H63D mutations. Suspected elevated ferritin due to alcohol/hepatic inflammation. PETH 300s-1000s.      Fibrosis staging:   Fibroscan 7/10/19 = F3 (kPa 11.5), S2  Fibroscan today = F4 (kPa 19.7), S2    Health Maintenance:  -- HCC screening: abd US & AFP 5/13/2024  -- Variceal screening: no EGD   -- Hepatitis A & B vaccination: +immunity    Interval history:  Presents today with wife. They are concerned because of new lesion seen on US. States that he has tried to cut back on alcohol use and has mostly eliminated alcohol use during the week but still drinks socially on the weekends. With this new lesion though, he states that he is going to quit drinking completely.    Liver enzymes remain elevated: AST 53 / ALT 68 (trending down with decreased alcohol)  TBili WNL, previously elevated (1.9)  Alk Phos WNL  MCV elevated     Denies symptoms of hepatic decompensation including jaundice, ascites, cognitive problems to suggest hepatic encephalopathy, or GI bleeding.     Past medical history, surgical history, problem list, family history, social history, allergies: Reviewed and updated in the appropriate section of the electronic  "medical record.    Medication list reviewed and updated.    Review of Systems   As per HPI    Physical Exam   Constitutional: Well-nourished. No distress. Alert and oriented.  Eyes: No scleral icterus.   Pulmonary/Chest: Respiratory effort normal. No respiratory distress.   Abdominal: No distension, no ascites appreciated.   Extremities: No edema.   Neurological: No tremor or asterixis. Gait normal.  Skin: No jaundice. No spider telangiectasias. +palmar erythema.  Psychiatric: Normal mood and affect. Speech, behavior, and thought content normal. No depression or anxiety noted.     MELD 3.0: 6 at 5/10/2024 10:46 AM  MELD-Na: 6 at 5/10/2024 10:46 AM  Calculated from:  Serum Creatinine: 0.8 mg/dL (Using min of 1 mg/dL) at 5/10/2024 10:46 AM  Serum Sodium: 139 mmol/L (Using max of 137 mmol/L) at 5/10/2024 10:46 AM  Total Bilirubin: 0.7 mg/dL (Using min of 1 mg/dL) at 5/10/2024 10:46 AM  Serum Albumin: 4.2 g/dL (Using max of 3.5 g/dL) at 5/10/2024 10:46 AM  INR(ratio): 1.0 at 5/10/2024 10:46 AM  Age at listing (hypothetical): 48 years  Sex: Male at 5/10/2024 10:46 AM    Vitals reviewed.  Ht 5' 4" (1.626 m)   Wt 67.7 kg (149 lb 4 oz)   BMI 25.62 kg/m²       LABS & DIAGNOSTIC STUDIES     I have personally reviewed pertinent laboratory findings:    Lab Results   Component Value Date    ALT 68 (H) 05/10/2024    AST 53 (H) 05/10/2024    ALKPHOS 79 05/10/2024    BILITOT 0.7 05/10/2024    ALBUMIN 4.2 05/10/2024    INR 1.0 05/10/2024       Lab Results   Component Value Date    WBC 6.28 02/21/2024    HGB 15.0 02/21/2024    HCT 44.9 02/21/2024     (H) 02/21/2024     02/21/2024       Lab Results   Component Value Date     05/10/2024    K 4.5 05/10/2024    BUN 8 05/10/2024    CREATININE 0.8 05/10/2024    ESTGFRAFRICA >60.0 04/12/2022    EGFRNONAA >60.0 04/12/2022       Lab Results   Component Value Date    SMOOTHMUSCAB Negative 1:40 05/23/2019    AMAIFA Negative 1:40 05/23/2019    IGGSERUM 1023 05/23/2019    " ANASCREEN Negative <1:160 05/23/2019    FERRITIN 1,269 (H) 03/18/2014    FESATURATED 26 03/18/2014    PETH 901 (A) 06/09/2020    XNSBN1EGBXLB MM 03/18/2014    DYOPK3DALVDG 133 03/18/2014    CERULOPLSM 19.0 03/18/2014    HEPBSAG Negative 05/23/2019    HEPBIGM Negative 03/06/2014    HEPBCAB Negative 05/23/2019    HEPCAB Negative 05/23/2019    HEPAIGM Negative 03/06/2014       Lab Results   Component Value Date    AFP 3.0 05/10/2024       I have personally reviewed the following result reports:  Abdominal US - 5/13/2024 notes:  Liver: Normal in size, measuring 13.1 cm. Homogeneous parenchymal echotexture. Hyperechoic focus with internal echogenic foci in the left hepatic lobe measuring 0.8 x 0.9 x 0.7 cm, new from prior study. HRI: 1.2, suggesting less than 5% steatosis.      ASSESSMENT & PLAN     48 y.o. male with:    1. Cirrhosis (based on Fibroscan) due to alcohol, well compensated   -- Transaminases remain elevated, due to continued alcohol use, but have trended down with decreased alcohol consumption. TBili previously elevated, but also trended down to WNL.   -- Fibroscan is suggestive of cirrhosis.   -- Patient lost to follow up, now up to date on HCC surveillance as of 5/13/2024 - new lesion seen on US  -- HCC screening every 6 months with ultrasound and AFP, next due 11/2024  -- Platelet count is normal and no evidence of portal HTN. Fibroscan kPa approaching 20. May need EGD to screen for varices though will defer until next visit. Consider repeating fibroscan with no alcohol use    MELD 3.0: 6 at 5/10/2024 10:46 AM  MELD-Na: 6 at 5/10/2024 10:46 AM  Calculated from:  Serum Creatinine: 0.8 mg/dL (Using min of 1 mg/dL) at 5/10/2024 10:46 AM  Serum Sodium: 139 mmol/L (Using max of 137 mmol/L) at 5/10/2024 10:46 AM  Total Bilirubin: 0.7 mg/dL (Using min of 1 mg/dL) at 5/10/2024 10:46 AM  Serum Albumin: 4.2 g/dL (Using max of 3.5 g/dL) at 5/10/2024 10:46 AM  INR(ratio): 1.0 at 5/10/2024 10:46 AM  Age at listing  (hypothetical): 48 years  Sex: Male at 5/10/2024 10:46 AM    2. Liver lesion  -- Recent US done 5/2024 notes - Hyperechoic focus with internal echogenic foci in the left hepatic lobe measuring 0.8 x 0.9 x 0.7 cm, new from prior study  -- Will obtain MRI W W/O contrast next available  -- AFP WNL (3.0)  -- Review MRI in IR conference    3. Alcohol use  -- Discussed risks of continued alcohol use including alcoholic hepatitis, symptoms of decompensation, liver failure, and death. He is aware that he would not be a transplant candidate (if ever needed) with continued alcohol use. Has cut back on alcohol but plans to stop drinking completely.  -- He acknowledges the need to stop drinking. Denies need for resources or clinic visit with psychiatry at this time       Orders Placed This Encounter   Procedures    MRI Abdomen W WO Contrast       *See AVS for patient education and instructions.      Follow up after MRI      Thank you for allowing me to participate in the care of Lance HARDY, JAYJAYP-C  Hepatology      I spent a total of 45 minutes on the day of the visit.This includes face to face time and non-face to face time preparing to see the patient (eg, review of tests), obtaining and/or reviewing separately obtained history, documenting clinical information in the electronic or other health record, independently interpreting results and communicating results to the patient/family/caregiver, and coordinating care.

## 2024-05-14 NOTE — PATIENT INSTRUCTIONS
Need MRI to follow up on liver lesion seen on US  Review in IR conference to determine follow up after MRI    Cirrhosis Education:    This is a web site that you may find helpful about cirrhosis : [email or web address].[email or web address]/    Because you have cirrhosis, it is important to attend clinic visits every 6 months with an Ultrasound and blood tests every 6 months to screen for liver cancer (you are at risk of developing liver cancer due to scar tissue in the liver)    Signs and symptoms of worsening liver disease include jaundice, fluid in the belly (ascites), and confusion/disorientation/slowed thought processes due to hepatic encephalopathy (toxins building up because of liver problems).   You should seek medical attention if any of these things occur.    Also, possible bleeding from esophageal varices (blood vessels in the stomach and foodpipe can burst and cause fatal bleeding).  Therefore, if you have symptoms of vomiting blood, blood in your stool, dark or black stools or vomiting coffee ground vomit, YOU SHOULD GO TO THE EMERGENCY ROOM IMMEDIATELY.     Cirrhosis can increase the risk of liver cancer, liver failure, and death. However, we will watch your liver function score (MELD score) closely with each clinic visit. A normal MELD score is 6, highest is 40. Your last one was an 6. We will check this with every clinic visit. A MELD 15 or higher is when we start to consider transplant because MELD 15 or higher indicates that the liver is not functioning as well     Cirrhosis Counseling  - NO alcohol use (includes beer, wine, and/or liquor)  - can take acetaminophen (Tylenol), no more than 2000 mg per day  - low sodium (salt) 2 gram per day diet  - high protein diet: 90 grams per day to prevent muscle mass loss. Drink at least 1 protein shake daily (Premier Protein is best option because it is very high protein and low sugar). Ok to use this as nighttime snack to fit it in   - resistance exercises for muscle  strength  - avoid raw seafoods due to the risk of fatal Vibrio vulnificus infection  - ultrasound of the liver every 6 months for liver cancer screening (you are at risk of developing liver cancer due to scar tissue in the liver)  - Upper endoscopy every 1-2 years to screen for varices in the stomach and foodpipe which can burst and cause fatal bleeding

## 2024-05-26 ENCOUNTER — OFFICE VISIT (OUTPATIENT)
Dept: URGENT CARE | Facility: CLINIC | Age: 49
End: 2024-05-26
Payer: COMMERCIAL

## 2024-05-26 VITALS
DIASTOLIC BLOOD PRESSURE: 78 MMHG | SYSTOLIC BLOOD PRESSURE: 133 MMHG | BODY MASS INDEX: 25.44 KG/M2 | RESPIRATION RATE: 18 BRPM | OXYGEN SATURATION: 98 % | WEIGHT: 149 LBS | HEIGHT: 64 IN | TEMPERATURE: 99 F | HEART RATE: 68 BPM

## 2024-05-26 DIAGNOSIS — J06.9 ACUTE URI: ICD-10-CM

## 2024-05-26 DIAGNOSIS — J01.90 ACUTE NON-RECURRENT SINUSITIS, UNSPECIFIED LOCATION: ICD-10-CM

## 2024-05-26 DIAGNOSIS — J20.9 ACUTE BRONCHITIS DUE TO INFECTION: ICD-10-CM

## 2024-05-26 DIAGNOSIS — J02.9 SORE THROAT: Primary | ICD-10-CM

## 2024-05-26 LAB
CTP QC/QA: YES
MOLECULAR STREP A: NEGATIVE
POC MOLECULAR INFLUENZA A AGN: NEGATIVE
POC MOLECULAR INFLUENZA B AGN: NEGATIVE
SARS-COV-2 AG RESP QL IA.RAPID: NEGATIVE

## 2024-05-26 PROCEDURE — 87651 STREP A DNA AMP PROBE: CPT | Mod: QW,S$GLB,, | Performed by: FAMILY MEDICINE

## 2024-05-26 PROCEDURE — 87811 SARS-COV-2 COVID19 W/OPTIC: CPT | Mod: QW,S$GLB,, | Performed by: FAMILY MEDICINE

## 2024-05-26 PROCEDURE — 87502 INFLUENZA DNA AMP PROBE: CPT | Mod: QW,S$GLB,, | Performed by: FAMILY MEDICINE

## 2024-05-26 PROCEDURE — 99214 OFFICE O/P EST MOD 30 MIN: CPT | Mod: S$GLB,,, | Performed by: FAMILY MEDICINE

## 2024-05-26 RX ORDER — PROMETHAZINE HYDROCHLORIDE AND DEXTROMETHORPHAN HYDROBROMIDE 6.25; 15 MG/5ML; MG/5ML
5 SYRUP ORAL 3 TIMES DAILY PRN
Qty: 120 ML | Refills: 0 | Status: SHIPPED | OUTPATIENT
Start: 2024-05-26 | End: 2024-06-05

## 2024-05-26 RX ORDER — ALBUTEROL SULFATE 90 UG/1
2 AEROSOL, METERED RESPIRATORY (INHALATION) EVERY 6 HOURS PRN
Qty: 18 G | Refills: 0 | Status: SHIPPED | OUTPATIENT
Start: 2024-05-26

## 2024-05-26 NOTE — PROGRESS NOTES
"Subjective:      Patient ID: Lance Hampton is a 48 y.o. male.    Vitals:  height is 5' 4" (1.626 m) and weight is 67.6 kg (149 lb). His oral temperature is 99 °F (37.2 °C). His blood pressure is 133/78 and his pulse is 68. His respiration is 18 and oxygen saturation is 98%.     Chief Complaint: Sore Throat    Patient presents with c/o cough, sore throat, running nose, sinus and chest congestion for 5 days.  Also reports occasional wheezing. Pt denies  CP,  dizziness, N/V, diarrhea, abdominal pain, dysuria, loss of smell or taste.        Sore Throat   This is a new problem. Episode onset: 6 days. The problem has been gradually worsening. There has been no fever. The pain is at a severity of 4/10. The pain is moderate. Associated symptoms include congestion, coughing, headaches and a hoarse voice. Pertinent negatives include no abdominal pain, diarrhea, drooling, ear discharge, ear pain, plugged ear sensation, neck pain, shortness of breath, stridor, swollen glands, trouble swallowing or vomiting. He has had exposure to strep. He has had no exposure to mono. He has tried nothing for the symptoms. The treatment provided no relief.       HENT:  Positive for congestion, postnasal drip, sinus pain, sinus pressure and sore throat. Negative for ear pain, ear discharge, drooling and trouble swallowing.    Neck: Negative for neck pain.   Respiratory:  Positive for cough. Negative for shortness of breath and stridor.    Gastrointestinal:  Negative for abdominal pain, vomiting, constipation and diarrhea.   Neurological:  Positive for headaches. Negative for dizziness and light-headedness.      Objective:     Physical Exam   Constitutional: He is oriented to person, place, and time. He appears well-developed. He is cooperative.  Non-toxic appearance. He does not appear ill. No distress.   HENT:   Head: Normocephalic and atraumatic.   Ears:   Right Ear: Hearing, tympanic membrane, external ear and ear canal normal. no " impacted cerumen  Left Ear: Hearing, tympanic membrane, external ear and ear canal normal. no impacted cerumen  Nose: Congestion present. No mucosal edema, rhinorrhea or nasal deformity. No epistaxis. Right sinus exhibits no maxillary sinus tenderness and no frontal sinus tenderness. Left sinus exhibits no maxillary sinus tenderness and no frontal sinus tenderness.   Mouth/Throat: Uvula is midline, oropharynx is clear and moist and mucous membranes are normal. No trismus in the jaw. Normal dentition. No uvula swelling. No oropharyngeal exudate or posterior oropharyngeal edema.      Comments: +ve pharyngeal erythema w/o tonsillar swelling or exudates.        Eyes: Conjunctivae and lids are normal. No scleral icterus.   Neck: Trachea normal and phonation normal. Neck supple. No edema present. No erythema present. No neck rigidity present.   Cardiovascular: Normal rate, regular rhythm, normal heart sounds and normal pulses.   No murmur heard.  Pulmonary/Chest: Effort normal and breath sounds normal. No stridor. No respiratory distress. He has no decreased breath sounds. He has no wheezes. He has no rhonchi. He has no rales.   Abdominal: Normal appearance. He exhibits no distension. There is no abdominal tenderness. There is no left CVA tenderness and no right CVA tenderness.   Musculoskeletal: Normal range of motion.         General: No deformity. Normal range of motion.      Cervical back: He exhibits no tenderness.   Lymphadenopathy:     He has no cervical adenopathy.   Neurological: He is alert, oriented to person, place, and time and at baseline. He exhibits normal muscle tone. Coordination normal.   Skin: Skin is warm, dry, intact, not diaphoretic and not pale.   Psychiatric: His speech is normal and behavior is normal. Judgment and thought content normal.   Nursing note and vitals reviewed.      Assessment:     1. Sore throat    2. Acute URI    3. Acute bronchitis due to infection    4. Acute non-recurrent  sinusitis, unspecified location        Plan:   Discussed exam findings/results/diagnosis/plan with patient. Advised to f/u with PCP within 2-5 days. ER precautions given if symptoms get any worse. All questions answered. Patient verbally understood and agreed with treatment plan.  Educational materials and instructions regarding the visit diagnosis and management provided.     Sore throat  -     POCT Strep A, Molecular  -     POCT Influenza A/B MOLECULAR    Acute URI  -     Cancel: POCT Influenza A/B  -     SARS Coronavirus 2 Antigen, POCT Manual Read    Acute bronchitis due to infection    Acute non-recurrent sinusitis, unspecified location    Other orders  -     promethazine-dextromethorphan (PROMETHAZINE-DM) 6.25-15 mg/5 mL Syrp; Take 5 mLs by mouth 3 (three) times daily as needed (cough).  Dispense: 120 mL; Refill: 0  -     albuterol (PROVENTIL/VENTOLIN HFA) 90 mcg/actuation inhaler; Inhale 2 puffs into the lungs every 6 (six) hours as needed for Wheezing or Shortness of Breath. Rescue  Dispense: 18 g; Refill: 0

## 2024-05-30 ENCOUNTER — HOSPITAL ENCOUNTER (OUTPATIENT)
Dept: RADIOLOGY | Facility: OTHER | Age: 49
Discharge: HOME | End: 2024-05-30
Payer: COMMERCIAL

## 2024-05-30 DIAGNOSIS — K74.60 CIRRHOSIS OF LIVER WITHOUT ASCITES, UNSPECIFIED HEPATIC CIRRHOSIS TYPE: ICD-10-CM

## 2024-05-30 DIAGNOSIS — K76.9 LIVER LESION: ICD-10-CM

## 2024-05-30 PROCEDURE — A9585 GADOBUTROL INJECTION: HCPCS

## 2024-05-30 PROCEDURE — 74183 MRI ABD W/O CNTR FLWD CNTR: CPT | Mod: 26,,, | Performed by: RADIOLOGY

## 2024-05-30 PROCEDURE — 74183 MRI ABD W/O CNTR FLWD CNTR: CPT | Mod: TC

## 2024-05-30 PROCEDURE — 25500020 PHARM REV CODE 255

## 2024-05-30 RX ORDER — GADOBUTROL 604.72 MG/ML
7 INJECTION INTRAVENOUS
Status: COMPLETED | OUTPATIENT
Start: 2024-05-30 | End: 2024-05-30

## 2024-05-30 RX ADMIN — GADOBUTROL 7 ML: 604.72 INJECTION INTRAVENOUS at 08:05

## 2024-06-03 ENCOUNTER — TELEPHONE (OUTPATIENT)
Dept: HEPATOLOGY | Facility: CLINIC | Age: 49
End: 2024-06-03
Payer: COMMERCIAL

## 2024-06-03 DIAGNOSIS — K74.60 CIRRHOSIS OF LIVER WITHOUT ASCITES, UNSPECIFIED HEPATIC CIRRHOSIS TYPE: Primary | ICD-10-CM

## 2024-06-03 NOTE — TELEPHONE ENCOUNTER
Appts scheduled   ----- Message from Todd Chicas NP sent at 6/3/2024 10:25 AM CDT -----  Please reach out to patient and schedule him for a follow up in 6 months with labs and US 1 week before.

## 2024-06-10 ENCOUNTER — PATIENT MESSAGE (OUTPATIENT)
Dept: INTERNAL MEDICINE | Facility: CLINIC | Age: 49
End: 2024-06-10
Payer: COMMERCIAL

## 2024-06-26 DIAGNOSIS — R73.03 PREDIABETES: ICD-10-CM

## 2024-08-28 ENCOUNTER — OFFICE VISIT (OUTPATIENT)
Dept: INTERNAL MEDICINE | Facility: CLINIC | Age: 49
End: 2024-08-28
Payer: COMMERCIAL

## 2024-08-28 VITALS
SYSTOLIC BLOOD PRESSURE: 129 MMHG | OXYGEN SATURATION: 99 % | BODY MASS INDEX: 25.21 KG/M2 | HEART RATE: 80 BPM | WEIGHT: 147.69 LBS | DIASTOLIC BLOOD PRESSURE: 80 MMHG | HEIGHT: 64 IN

## 2024-08-28 DIAGNOSIS — Z00.00 ROUTINE HEALTH MAINTENANCE: Primary | ICD-10-CM

## 2024-08-28 PROCEDURE — 3008F BODY MASS INDEX DOCD: CPT | Mod: CPTII,S$GLB,, | Performed by: FAMILY MEDICINE

## 2024-08-28 PROCEDURE — 99396 PREV VISIT EST AGE 40-64: CPT | Mod: S$GLB,,, | Performed by: FAMILY MEDICINE

## 2024-08-28 PROCEDURE — 3074F SYST BP LT 130 MM HG: CPT | Mod: CPTII,S$GLB,, | Performed by: FAMILY MEDICINE

## 2024-08-28 PROCEDURE — 3079F DIAST BP 80-89 MM HG: CPT | Mod: CPTII,S$GLB,, | Performed by: FAMILY MEDICINE

## 2024-08-28 PROCEDURE — 99999 PR PBB SHADOW E&M-EST. PATIENT-LVL III: CPT | Mod: PBBFAC,,, | Performed by: FAMILY MEDICINE

## 2024-08-28 PROCEDURE — 1159F MED LIST DOCD IN RCRD: CPT | Mod: CPTII,S$GLB,, | Performed by: FAMILY MEDICINE

## 2024-08-28 NOTE — PROGRESS NOTES
Subjective:       Patient ID: Lance Hampton is a 48 y.o. male.    Chief Complaint: Annual Exam    HPI  History of Present Illness  The patient is a 48-year-old male here for a follow-up visit.    He has recently been followed by hepatology in 05/2024 and was found to have a new lesion on hepatic ultrasound. This was subsequently followed by an MRI of the abdomen with and without contrast, which showed no concerning findings. The plan is to conduct surveillance for hepatocellular carcinoma (HCC) every 6 months with ultrasound, with the next one scheduled for 11/2024. His AST and ALT levels have trended down as he has significantly reduced his alcohol consumption.    He reports feeling well overall. A recent MRI scan revealed a benign spot, alleviating his concerns about potential cancer. He has not yet started any medication for cholesterol management. His alcohol consumption has decreased, although it fluctuates. He admits to not monitoring his diet closely. Apart from managing his alcohol intake, he feels generally healthy.    FAMILY HISTORY  His mother has high cholesterol.    Has 5 yo      All of your core healthy metrics are met.      Social History     Social History Narrative    Rates diet as fair.      He is not satisfied with weight.    He does drink at least 1/2 gallon water daily.    He drinks 1 coffee/tea/caffeine-containing soft drinks daily.    Total sleep time at night is 8 hours.    He works 40-50 hours per week.    He does wear seat belts.    Hobbies include golf, foodie.               Family History   Problem Relation Name Age of Onset    Alcohol abuse Father      Cancer Maternal Grandfather          prostate cancer    Alcohol abuse Paternal Grandfather      Cirrhosis Paternal Grandfather       No current outpatient medications on file.    Review of Systems   Constitutional:  Negative for chills and fever.   Eyes:  Negative for visual disturbance.   Respiratory:  Negative for cough and  "shortness of breath.    Cardiovascular:  Negative for chest pain.   Gastrointestinal:  Negative for abdominal pain.   Neurological:  Negative for dizziness.       Objective:   /80 (BP Location: Left arm, Patient Position: Sitting)   Pulse 80   Ht 5' 4" (1.626 m)   Wt 67 kg (147 lb 11.3 oz)   SpO2 99%   BMI 25.35 kg/m²      Physical Exam  Vitals reviewed.   Constitutional:       General: He is not in acute distress.     Appearance: He is well-developed. He is not diaphoretic.   HENT:      Head: Normocephalic and atraumatic.      Nose: Nose normal.   Eyes:      General:         Right eye: No discharge.         Left eye: No discharge.      Conjunctiva/sclera: Conjunctivae normal.      Pupils: Pupils are equal, round, and reactive to light.   Neck:      Thyroid: No thyromegaly.   Cardiovascular:      Rate and Rhythm: Normal rate and regular rhythm.      Heart sounds: Normal heart sounds. No murmur heard.  Pulmonary:      Effort: Pulmonary effort is normal. No respiratory distress.      Breath sounds: Normal breath sounds. No wheezing.   Abdominal:      General: There is no distension.      Palpations: Abdomen is soft.   Skin:     General: Skin is warm.      Findings: No rash.   Neurological:      Mental Status: He is alert and oriented to person, place, and time.   Psychiatric:         Behavior: Behavior normal.         Physical Exam      @resultssec@  Assessment & Plan   Assessment & Plan  1. Hypercholesterolemia.  His elevated LDL levels suggest a genetic predisposition to hypercholesterolemia, although alcohol consumption may also be a contributing factor. A lipid panel will be ordered for further evaluation. A message has been sent to Dr. AMANDA seeking approval to initiate cholesterol medication, considering his liver condition. He is advised to monitor his diet and reduce alcohol intake.    2. Hepatic lesion.  A recent MRI of the abdomen showed no concerning findings, confirming the lesion seen on " ultrasound is benign. Surveillance for hepatocellular carcinoma (HCC) will continue with ultrasound every 6 months, with the next one scheduled for November of this year.    3. Alcohol use.  He has significantly reduced his alcohol consumption, which has positively impacted his liver function as evidenced by decreased AST and ALT levels. He is encouraged to continue reducing alcohol intake.        Problem List Items Addressed This Visit    None  Visit Diagnoses       Routine health maintenance    -  Primary    Relevant Orders    Lipid Panel    Comprehensive Metabolic Panel              Immunizations Administered on Date of Encounter - 8/28/2024       No immunizations on file.             No follow-ups on file.      Disclaimer:  This note may have been prepared using voice recognition software, it may have not been extensively proofed, as such there could be errors within the text such as sound alike errors.

## 2024-09-06 ENCOUNTER — LAB VISIT (OUTPATIENT)
Dept: LAB | Facility: HOSPITAL | Age: 49
End: 2024-09-06
Attending: FAMILY MEDICINE
Payer: COMMERCIAL

## 2024-09-06 DIAGNOSIS — R73.03 PREDIABETES: ICD-10-CM

## 2024-09-06 DIAGNOSIS — Z00.00 ROUTINE HEALTH MAINTENANCE: ICD-10-CM

## 2024-09-06 LAB
ALBUMIN SERPL BCP-MCNC: 4 G/DL (ref 3.5–5.2)
ALP SERPL-CCNC: 113 U/L (ref 55–135)
ALT SERPL W/O P-5'-P-CCNC: 180 U/L (ref 10–44)
ANION GAP SERPL CALC-SCNC: 11 MMOL/L (ref 8–16)
AST SERPL-CCNC: 187 U/L (ref 10–40)
BILIRUB SERPL-MCNC: 0.7 MG/DL (ref 0.1–1)
BUN SERPL-MCNC: 9 MG/DL (ref 6–20)
CALCIUM SERPL-MCNC: 9.8 MG/DL (ref 8.7–10.5)
CHLORIDE SERPL-SCNC: 103 MMOL/L (ref 95–110)
CHOLEST SERPL-MCNC: 256 MG/DL (ref 120–199)
CHOLEST/HDLC SERPL: 3.8 {RATIO} (ref 2–5)
CO2 SERPL-SCNC: 23 MMOL/L (ref 23–29)
CREAT SERPL-MCNC: 0.9 MG/DL (ref 0.5–1.4)
EST. GFR  (NO RACE VARIABLE): >60 ML/MIN/1.73 M^2
ESTIMATED AVG GLUCOSE: 97 MG/DL (ref 68–131)
GLUCOSE SERPL-MCNC: 103 MG/DL (ref 70–110)
HBA1C MFR BLD: 5 % (ref 4–5.6)
HDLC SERPL-MCNC: 68 MG/DL (ref 40–75)
HDLC SERPL: 26.6 % (ref 20–50)
LDLC SERPL CALC-MCNC: 165.6 MG/DL (ref 63–159)
NONHDLC SERPL-MCNC: 188 MG/DL
POTASSIUM SERPL-SCNC: 4.2 MMOL/L (ref 3.5–5.1)
PROT SERPL-MCNC: 7.3 G/DL (ref 6–8.4)
SODIUM SERPL-SCNC: 137 MMOL/L (ref 136–145)
TRIGL SERPL-MCNC: 112 MG/DL (ref 30–150)

## 2024-09-06 PROCEDURE — 83036 HEMOGLOBIN GLYCOSYLATED A1C: CPT | Performed by: FAMILY MEDICINE

## 2024-09-06 PROCEDURE — 80061 LIPID PANEL: CPT | Performed by: FAMILY MEDICINE

## 2024-09-06 PROCEDURE — 36415 COLL VENOUS BLD VENIPUNCTURE: CPT | Mod: PO | Performed by: FAMILY MEDICINE

## 2024-09-06 PROCEDURE — 80053 COMPREHEN METABOLIC PANEL: CPT | Performed by: FAMILY MEDICINE

## 2024-09-12 ENCOUNTER — PATIENT MESSAGE (OUTPATIENT)
Dept: INTERNAL MEDICINE | Facility: CLINIC | Age: 49
End: 2024-09-12
Payer: COMMERCIAL

## 2024-09-12 DIAGNOSIS — E78.5 HYPERLIPIDEMIA, UNSPECIFIED HYPERLIPIDEMIA TYPE: Primary | ICD-10-CM

## 2024-09-12 RX ORDER — ATORVASTATIN CALCIUM 10 MG/1
10 TABLET, FILM COATED ORAL DAILY
Qty: 90 TABLET | Refills: 3 | Status: SHIPPED | OUTPATIENT
Start: 2024-09-12 | End: 2025-09-12

## 2024-10-31 ENCOUNTER — LAB VISIT (OUTPATIENT)
Dept: LAB | Facility: OTHER | Age: 49
End: 2024-10-31
Attending: FAMILY MEDICINE
Payer: COMMERCIAL

## 2024-10-31 ENCOUNTER — PATIENT MESSAGE (OUTPATIENT)
Dept: INTERNAL MEDICINE | Facility: CLINIC | Age: 49
End: 2024-10-31
Payer: COMMERCIAL

## 2024-10-31 DIAGNOSIS — Z78.9 STATIN INTOLERANCE: ICD-10-CM

## 2024-10-31 DIAGNOSIS — E78.5 HYPERLIPIDEMIA, UNSPECIFIED HYPERLIPIDEMIA TYPE: ICD-10-CM

## 2024-10-31 DIAGNOSIS — E78.5 HYPERLIPIDEMIA, UNSPECIFIED HYPERLIPIDEMIA TYPE: Primary | ICD-10-CM

## 2024-10-31 LAB
ALBUMIN SERPL BCP-MCNC: 4.3 G/DL (ref 3.5–5.2)
ALP SERPL-CCNC: 95 U/L (ref 40–150)
ALT SERPL W/O P-5'-P-CCNC: 314 U/L (ref 10–44)
ANION GAP SERPL CALC-SCNC: 10 MMOL/L (ref 8–16)
AST SERPL-CCNC: 318 U/L (ref 10–40)
BILIRUB SERPL-MCNC: 0.9 MG/DL (ref 0.1–1)
BUN SERPL-MCNC: 8 MG/DL (ref 6–20)
CALCIUM SERPL-MCNC: 9.7 MG/DL (ref 8.7–10.5)
CHLORIDE SERPL-SCNC: 104 MMOL/L (ref 95–110)
CO2 SERPL-SCNC: 24 MMOL/L (ref 23–29)
CREAT SERPL-MCNC: 0.9 MG/DL (ref 0.5–1.4)
EST. GFR  (NO RACE VARIABLE): >60 ML/MIN/1.73 M^2
GLUCOSE SERPL-MCNC: 93 MG/DL (ref 70–110)
POTASSIUM SERPL-SCNC: 4.2 MMOL/L (ref 3.5–5.1)
PROT SERPL-MCNC: 7.7 G/DL (ref 6–8.4)
SODIUM SERPL-SCNC: 138 MMOL/L (ref 136–145)

## 2024-10-31 PROCEDURE — 36415 COLL VENOUS BLD VENIPUNCTURE: CPT | Performed by: FAMILY MEDICINE

## 2024-10-31 PROCEDURE — 80053 COMPREHEN METABOLIC PANEL: CPT | Performed by: FAMILY MEDICINE

## 2024-11-26 ENCOUNTER — HOSPITAL ENCOUNTER (OUTPATIENT)
Dept: RADIOLOGY | Facility: OTHER | Age: 49
Discharge: HOME | End: 2024-11-26
Payer: COMMERCIAL

## 2024-11-26 DIAGNOSIS — K74.60 CIRRHOSIS OF LIVER WITHOUT ASCITES, UNSPECIFIED HEPATIC CIRRHOSIS TYPE: ICD-10-CM

## 2024-11-26 PROCEDURE — 76705 ECHO EXAM OF ABDOMEN: CPT | Mod: 26,,, | Performed by: RADIOLOGY

## 2024-11-26 PROCEDURE — 76705 ECHO EXAM OF ABDOMEN: CPT | Mod: TC

## 2024-11-30 ENCOUNTER — E-CONSULT (OUTPATIENT)
Dept: CARDIOLOGY | Facility: CLINIC | Age: 49
End: 2024-11-30
Payer: COMMERCIAL

## 2024-11-30 DIAGNOSIS — K74.60 CIRRHOSIS OF LIVER WITHOUT ASCITES, UNSPECIFIED HEPATIC CIRRHOSIS TYPE: ICD-10-CM

## 2024-11-30 DIAGNOSIS — R74.8 ELEVATED LIVER ENZYMES: ICD-10-CM

## 2024-11-30 DIAGNOSIS — K21.9 GASTROESOPHAGEAL REFLUX DISEASE WITHOUT ESOPHAGITIS: Primary | ICD-10-CM

## 2024-11-30 DIAGNOSIS — E78.00 PURE HYPERCHOLESTEROLEMIA: ICD-10-CM

## 2024-11-30 DIAGNOSIS — F10.90 METABOLIC DYSFUNCTION-ASSOCIATED STEATOTIC LIVER DISEASE AND INCREASED ALCOHOL INTAKE (METALD): ICD-10-CM

## 2024-11-30 DIAGNOSIS — K76.0 METABOLIC DYSFUNCTION-ASSOCIATED STEATOTIC LIVER DISEASE AND INCREASED ALCOHOL INTAKE (METALD): ICD-10-CM

## 2024-11-30 NOTE — CONSULTS
[facility]  Response for E-Consult     Patient Name: Lance Hampton  MRN: [id number]  Primary Care Provider: Neo Garcia DO   Requesting Provider: Neo Garcia,   Consults    Recommendation: There is no contraindication for repatha in liver disease , in this case liver cirrhosis without ascites.    Contingency that warrants a repeat eConsult or referral: Pt is a 47 y/o male with PMHx for liver cirrhosis without ascites, HLP, history of etoh abuse, GERD referred for use of repatha.There is no contraindication for repatha in liver disease , in this case liver cirrhosis without ascites.  Thanks you.    Total time of Consultation: 5 minute    I did not speak to the requesting provider verbally about this.     *This eConsult is based on the clinical data available to me and is furnished without benefit of a physical examination. The eConsult will need to be interpreted in light of any clinical issues or changes in patient status not available to me at the time of filing this eConsults. Significant changes in patient condition or level of acuity should result in immediate formal consultation and reevaluation. Please alert me if you have further questions.    Thank you for this eConsult referral.     Reggie Arzola MD  [facility]

## 2024-12-04 ENCOUNTER — OFFICE VISIT (OUTPATIENT)
Dept: HEPATOLOGY | Facility: CLINIC | Age: 49
End: 2024-12-04
Payer: COMMERCIAL

## 2024-12-04 VITALS — WEIGHT: 148.56 LBS | HEIGHT: 64 IN | BODY MASS INDEX: 25.36 KG/M2

## 2024-12-04 DIAGNOSIS — K76.0 METABOLIC DYSFUNCTION-ASSOCIATED STEATOTIC LIVER DISEASE AND INCREASED ALCOHOL INTAKE (METALD): ICD-10-CM

## 2024-12-04 DIAGNOSIS — E66.3 OVERWEIGHT (BMI 25.0-29.9): ICD-10-CM

## 2024-12-04 DIAGNOSIS — F10.10 ALCOHOL ABUSE: ICD-10-CM

## 2024-12-04 DIAGNOSIS — K74.60 CIRRHOSIS OF LIVER WITHOUT ASCITES, UNSPECIFIED HEPATIC CIRRHOSIS TYPE: Primary | ICD-10-CM

## 2024-12-04 DIAGNOSIS — R74.8 ELEVATED LIVER ENZYMES: ICD-10-CM

## 2024-12-04 DIAGNOSIS — F10.90 METABOLIC DYSFUNCTION-ASSOCIATED STEATOTIC LIVER DISEASE AND INCREASED ALCOHOL INTAKE (METALD): ICD-10-CM

## 2024-12-04 DIAGNOSIS — D18.03 LIVER HEMANGIOMA: ICD-10-CM

## 2024-12-04 PROCEDURE — 99999 PR PBB SHADOW E&M-EST. PATIENT-LVL III: CPT | Mod: PBBFAC,,,

## 2024-12-04 NOTE — PROGRESS NOTES
[facility] - Established Patient    PCP: Neo Garcia DO    Chief Complaint: Follow-up for fatty liver, elevated liver enzymes, hepatic fibrosis       HISTORY     This is a 48 y.o. male with PMH noted below, here for follow-up of alcoholic cirrhosis.    Fatty liver first noted on abd US in 2013.    His transaminases have been elevated since at least 2011, max 200s. Synthetic liver function WNL. Enzymes have previously improved with a reduction in alcohol use.    Previous serologic w/u negative for Florencio's, alpha-1 antitrypsin deficiency, hemochromatosis, autoimmune etiology, and viral hepatitis.    Prior serologic workup:   Lab Results   Component Value Date    SMOOTHMUSCAB Negative 1:40 05/23/2019    AMAIFA Negative 1:40 05/23/2019    IGGSERUM 1023 05/23/2019    ANASCREEN Negative <1:160 05/23/2019    FERRITIN 1,269 (H) 03/18/2014    FESATURATED 26 03/18/2014    PETH 901 (A) 06/09/2020    CERULOPLSM 19.0 03/18/2014    HEPBSAG Negative 05/23/2019    HEPCAB Negative 05/23/2019    HEPAIGM Negative 03/06/2014     Ferritin elevated >1200 though iron sat WNL; HH DNA negative for C282Y and H63D mutations. Suspected elevated ferritin due to alcohol/hepatic inflammation.         Interval history:  Presents today alone. Reports that he is still drinking, but less often. Will not drink if he is at home, only drinks for social events. Typically drinks excessively when he does though about every 2 weeks. He is not ready to quit. States that the longest period he has gone without drinking was about 6 weeks. States that he did try to start atorvastatin but had a increase in liver enzymes so he stopped.    Denies symptoms of hepatic decompensation including jaundice, ascites, cognitive problems to suggest hepatic encephalopathy, or GI bleeding.         Past medical history, surgical history, problem list, family history, social history, allergies: Reviewed and updated in the appropriate section of the  "electronic medical record.    Medication list reviewed and updated.    Review of Systems   As per HPI    Physical Exam   Constitutional: Well-nourished. No distress. Alert and oriented.  Eyes: No scleral icterus.   Pulmonary/Chest: Respiratory effort normal. No respiratory distress.   Abdominal: No distension, no ascites appreciated.   Extremities: No edema.   Neurological: No tremor or asterixis. Gait normal.  Skin: No jaundice. No spider telangiectasias. +palmar erythema.  Psychiatric: Normal mood and affect. Speech, behavior, and thought content normal. No depression or anxiety noted.         Vitals reviewed.  Ht 5' 4" (1.626 m)   Wt 67.4 kg (148 lb 9.4 oz)   BMI 25.51 kg/m²       LABS & DIAGNOSTIC STUDIES     Liver enzymes remain elevated (historically trend down with decrease in alcohol use)  Synthetic liver function  WNL  Alk Phos WNL  PLT wnl    Lab Results   Component Value Date     (H) 11/26/2024    AST 91 (H) 11/26/2024    ALKPHOS 82 11/26/2024    BILITOT 0.7 11/26/2024    ALBUMIN 4.5 11/26/2024    INR 1.0 11/26/2024     11/26/2024     Lab Results   Component Value Date    AFP 3.2 11/26/2024         MELD 3.0: 6 at 11/26/2024 10:18 AM  MELD-Na: 6 at 11/26/2024 10:18 AM  Calculated from:  Serum Creatinine: 0.9 mg/dL (Using min of 1 mg/dL) at 11/26/2024 10:18 AM  Serum Sodium: 139 mmol/L (Using max of 137 mmol/L) at 11/26/2024 10:18 AM  Total Bilirubin: 0.7 mg/dL (Using min of 1 mg/dL) at 11/26/2024 10:18 AM  Serum Albumin: 4.5 g/dL (Using max of 3.5 g/dL) at 11/26/2024 10:18 AM  INR(ratio): 1 at 11/26/2024 10:18 AM  Age at listing (hypothetical): 48 years  Sex: Male at 11/26/2024 10:18 AM      Fibrosis staging:   Fibroscan 7/10/19 = F3 (kPa 11.5), S2  Fibroscan 11/2022 = F4 (kPa 19.7), S2    Health Maintenance:  -- HCC screening: abd US no new lesions, AFP wnl - next due 5/2025  -- Variceal screening: no EGD - referral sent and preop call scheduled  -- Hepatitis A & B vaccination: " +immunity      Imaging:  ABD US done 11/2024 noted  FINDINGS:  Pancreas is obscured by gas.  The gallbladder has been removed.  The bile duct measures 3.1 mm.  The liver measures 12.6 cm, and the spleen 7.9.  There is a hyperechoic area left lobe of the liver measuring 0.8 cm.     Impression:     No acute process seen.     Hyperechoic liver lesion consistent with a benign hemangioma.        ASSESSMENT & PLAN     48 y.o. male with:    1. Cirrhosis (based on Fibroscan) due to alcohol, well compensated   -- Transaminases remain elevated, due to continued alcohol use, but have trended down with decreased alcohol consumption.   -- Fibroscan in 2022 is suggestive of cirrhosis.   -- HCC screening every 6 months with ultrasound and AFP, next due 5/2025  -- MELD remains low at 6 - will repeat every 6 months  -- EGD not done - referral placed and preop call scheduled  -- discussed again the importance of refraining from alcohol  -- cirrhosis education discussed and provided via AVS    2. Liver hemangioma  -- liver hemangioma (previously characterized on MRI) noted on US 11/2024 - no new liver lesions  -- AFP WNL     3. Alcohol use  -- Discussed risks of continued alcohol use including alcoholic hepatitis, symptoms of decompensation, liver failure, and death. He is aware that he would not be a transplant candidate (if ever needed) with continued alcohol use. Has cut back on alcohol but is not ready to stop drinking completely.  -- He acknowledges the need to stop drinking. Denies need for resources or clinic visit with psychiatry at this time           *See AVS for patient education and instructions.      Follow up in about 6 months (around 6/4/2025). With labs and US prior  Thank you for allowing me to participate in the care of Lance Hampton    Orders Placed This Encounter   Procedures    US Abdomen Limited    Alpha-Fetoprotein and AFP L-3    CBC Without Differential    Comprehensive Metabolic Panel    Protime-INR     Phosphatidylethanol (PETH)    Ambulatory referral/consult to Endo Procedure        PRIYA Deleon, FNP-C  Nurse Practitioner  [facility]  [facility]       I spent a total of 40 minutes on the day of the visit.This includes face to face time and non-face to face time preparing to see the patient (eg, review of tests), obtaining and/or reviewing separately obtained history, documenting clinical information in the electronic or other health record, independently interpreting results and communicating results to the patient/family/caregiver, and coordinating care.

## 2024-12-04 NOTE — PATIENT INSTRUCTIONS
US and labs every 6 months  2. Follow up in 6 months  3. EGD referral placed    Cirrhosis Education:    This is a web site that you may find helpful about cirrhosis : https://cirrhosiscare.ca/    Because you have cirrhosis, it is important to attend clinic visits every 6 months with an Ultrasound and blood tests every 6 months to screen for liver cancer (you are at risk of developing liver cancer due to scar tissue in the liver)    Signs and symptoms of worsening liver disease include jaundice, fluid in the belly (ascites), and confusion/disorientation/slowed thought processes due to hepatic encephalopathy (toxins building up because of liver problems).   You should seek medical attention if any of these things occur.    Also, possible bleeding from esophageal varices (blood vessels in the stomach and foodpipe can burst and cause fatal bleeding).  Therefore, if you have symptoms of vomiting blood, blood in your stool, dark or black stools or vomiting coffee ground vomit, YOU SHOULD GO TO THE EMERGENCY ROOM IMMEDIATELY.     Cirrhosis can increase the risk of liver cancer, liver failure, and death. However, we will watch your liver function score (MELD score) closely with each clinic visit. A normal MELD score is 6, highest is 40. Your last one was an 6. We will check this with every clinic visit. A MELD 15 or higher is when we start to consider transplant because MELD 15 or higher indicates that the liver is not functioning as well     Cirrhosis Counseling  - NO alcohol use (includes beer, wine, and/or liquor)  - can take acetaminophen (Tylenol), no more than 2000 mg per day  - low sodium (salt) 2 gram per day diet  - high protein diet: 90 grams per day to prevent muscle mass loss. Drink at least 1 protein shake daily (Premier Protein is best option because it is very high protein and low sugar). Ok to use this as nighttime snack to fit it in   - resistance exercises for muscle strength  - avoid raw seafoods due to  the risk of fatal Vibrio vulnificus infection  - ultrasound of the liver every 6 months for liver cancer screening (you are at risk of developing liver cancer due to scar tissue in the liver)  - Upper endoscopy every 1-2 years to screen for varices in the stomach and foodpipe which can burst and cause fatal bleeding

## 2024-12-12 ENCOUNTER — TELEPHONE (OUTPATIENT)
Dept: ENDOSCOPY | Facility: HOSPITAL | Age: 49
End: 2024-12-12

## 2024-12-12 ENCOUNTER — CLINICAL SUPPORT (OUTPATIENT)
Dept: ENDOSCOPY | Facility: HOSPITAL | Age: 49
End: 2024-12-12
Payer: COMMERCIAL

## 2024-12-12 VITALS — WEIGHT: 148 LBS | BODY MASS INDEX: 25.27 KG/M2 | HEIGHT: 64 IN

## 2024-12-12 DIAGNOSIS — K74.60 CIRRHOSIS OF LIVER WITHOUT ASCITES, UNSPECIFIED HEPATIC CIRRHOSIS TYPE: ICD-10-CM

## 2024-12-12 NOTE — TELEPHONE ENCOUNTER
Referral for procedure from PAT appointment      Spoke to pt to schedule procedure(s) Upper Endoscopy (EGD)       Physician to perform procedure(s) Dr. DIANA Aguilera  Date of Procedure (s) 02/06/25  Arrival Time 9:00 AM  Time of Procedure(s) 10:00 AM   Location of Procedure(s) [address]  Type of Rx Prep sent to patient: N/A  Instructions provided to patient via [facility]    Patient was informed on the following information and verbalized understanding. Screening questionnaire reviewed with patient and complete. If procedure requires anesthesia, a responsible adult needs to be present to accompany the patient home, patient cannot drive after receiving anesthesia. Appointment details are tentative, especially check-in time. Patient will receive a prep-op call 7 days prior to confirm check-in time for procedure. If applicable the patient should contact their pharmacy to verify Rx for procedure prep is ready for pick-up. Patient was advised to call the scheduling department at [phone] if pharmacy states no Rx is available. Patient was advised to call the endoscopy scheduling department if any questions or concerns arise.      [facility] Scheduling Department

## 2025-01-23 ENCOUNTER — PATIENT MESSAGE (OUTPATIENT)
Dept: INTERNAL MEDICINE | Facility: CLINIC | Age: 50
End: 2025-01-23
Payer: COMMERCIAL

## 2025-01-23 DIAGNOSIS — K70.30 ALCOHOLIC CIRRHOSIS OF LIVER WITHOUT ASCITES: ICD-10-CM

## 2025-01-23 DIAGNOSIS — Z78.9 STATIN INTOLERANCE: Primary | ICD-10-CM

## 2025-01-23 DIAGNOSIS — E78.5 HYPERLIPIDEMIA, UNSPECIFIED HYPERLIPIDEMIA TYPE: ICD-10-CM

## 2025-02-05 ENCOUNTER — TELEPHONE (OUTPATIENT)
Dept: GASTROENTEROLOGY | Facility: CLINIC | Age: 50
End: 2025-02-05
Payer: COMMERCIAL

## 2025-02-05 NOTE — TELEPHONE ENCOUNTER
----- Message from Lisa sent at 2025 10:29 AM CST -----  Regarding: APPT  Contact: [phone]  Pt is requesting a call from someone in the office and is asking for a return call back soon. Thanks.     Reason for call:discuss plan of care for time for surgery and prep      Patient's DX:     Patient requesting call back or [facility] [phone]

## 2025-02-05 NOTE — TELEPHONE ENCOUNTER
Spoke with patient.   All questions answered to patients satisfaction regarding his procedure for tomorrow 2/6.   Dianna

## 2025-02-06 ENCOUNTER — ANESTHESIA EVENT (OUTPATIENT)
Dept: ENDOSCOPY | Facility: HOSPITAL | Age: 50
End: 2025-02-06
Payer: COMMERCIAL

## 2025-02-06 ENCOUNTER — ANESTHESIA (OUTPATIENT)
Dept: ENDOSCOPY | Facility: HOSPITAL | Age: 50
End: 2025-02-06
Payer: COMMERCIAL

## 2025-02-06 ENCOUNTER — HOSPITAL ENCOUNTER (OUTPATIENT)
Facility: HOSPITAL | Age: 50
Discharge: HOME | End: 2025-02-06
Attending: INTERNAL MEDICINE | Admitting: INTERNAL MEDICINE
Payer: COMMERCIAL

## 2025-02-06 ENCOUNTER — DOCUMENTATION ONLY (OUTPATIENT)
Dept: ENDOSCOPY | Facility: HOSPITAL | Age: 50
End: 2025-02-06
Payer: COMMERCIAL

## 2025-02-06 VITALS
SYSTOLIC BLOOD PRESSURE: 125 MMHG | RESPIRATION RATE: 18 BRPM | HEIGHT: 64 IN | WEIGHT: 148.13 LBS | DIASTOLIC BLOOD PRESSURE: 86 MMHG | HEART RATE: 74 BPM | OXYGEN SATURATION: 98 % | TEMPERATURE: 98 F | BODY MASS INDEX: 25.29 KG/M2

## 2025-02-06 DIAGNOSIS — K74.60 CIRRHOSIS OF LIVER: ICD-10-CM

## 2025-02-06 PROCEDURE — 63600175 PHARM REV CODE 636 W HCPCS

## 2025-02-06 PROCEDURE — 88305 TISSUE EXAM BY PATHOLOGIST: CPT | Mod: 59 | Performed by: PATHOLOGY

## 2025-02-06 PROCEDURE — E9220 PRA ENDO ANESTHESIA: HCPCS | Mod: ,,,

## 2025-02-06 PROCEDURE — 37000009 HC ANESTHESIA EA ADD 15 MINS: Performed by: INTERNAL MEDICINE

## 2025-02-06 PROCEDURE — 37000008 HC ANESTHESIA 1ST 15 MINUTES: Performed by: INTERNAL MEDICINE

## 2025-02-06 PROCEDURE — 88342 IMHCHEM/IMCYTCHM 1ST ANTB: CPT | Performed by: PATHOLOGY

## 2025-02-06 PROCEDURE — 27201012 HC FORCEPS, HOT/COLD, DISP: Performed by: INTERNAL MEDICINE

## 2025-02-06 PROCEDURE — 43239 EGD BIOPSY SINGLE/MULTIPLE: CPT | Performed by: INTERNAL MEDICINE

## 2025-02-06 PROCEDURE — 43239 EGD BIOPSY SINGLE/MULTIPLE: CPT | Mod: ,,, | Performed by: INTERNAL MEDICINE

## 2025-02-06 RX ORDER — LIDOCAINE HYDROCHLORIDE 20 MG/ML
INJECTION INTRAVENOUS
Status: DISCONTINUED | OUTPATIENT
Start: 2025-02-06 | End: 2025-02-06

## 2025-02-06 RX ORDER — SODIUM CHLORIDE 9 MG/ML
INJECTION, SOLUTION INTRAVENOUS CONTINUOUS
Status: DISCONTINUED | OUTPATIENT
Start: 2025-02-06 | End: 2025-02-06 | Stop reason: HOSPADM

## 2025-02-06 RX ORDER — PANTOPRAZOLE SODIUM 40 MG/1
40 TABLET, DELAYED RELEASE ORAL DAILY
Qty: 90 TABLET | Refills: 3 | Status: SHIPPED | OUTPATIENT
Start: 2025-02-06 | End: 2026-02-06

## 2025-02-06 RX ORDER — PROPOFOL 10 MG/ML
VIAL (ML) INTRAVENOUS
Status: DISCONTINUED | OUTPATIENT
Start: 2025-02-06 | End: 2025-02-06

## 2025-02-06 RX ADMIN — PROPOFOL 50 MG: 10 INJECTION, EMULSION INTRAVENOUS at 09:02

## 2025-02-06 RX ADMIN — GLYCOPYRROLATE 0.2 MG: 0.2 INJECTION, SOLUTION INTRAMUSCULAR; INTRAVENOUS at 09:02

## 2025-02-06 RX ADMIN — PROPOFOL 200 MCG/KG/MIN: 10 INJECTION, EMULSION INTRAVENOUS at 09:02

## 2025-02-06 RX ADMIN — PROPOFOL 100 MG: 10 INJECTION, EMULSION INTRAVENOUS at 09:02

## 2025-02-06 RX ADMIN — LIDOCAINE HYDROCHLORIDE 100 MG: 20 INJECTION INTRAVENOUS at 09:02

## 2025-02-06 NOTE — ANESTHESIA POSTPROCEDURE EVALUATION
Anesthesia Post Evaluation    Patient: Lance Hampton    Procedure(s) Performed: Procedure(s) (LRB):  EGD (ESOPHAGOGASTRODUODENOSCOPY) (N/A)    Final Anesthesia Type: general      Patient location during evaluation: PACU  Patient participation: Yes- Able to Participate  Level of consciousness: awake and alert  Post-procedure vital signs: reviewed and stable  Pain management: adequate  Airway patency: patent    PONV status at discharge: No PONV  Anesthetic complications: no      Cardiovascular status: blood pressure returned to baseline and hemodynamically stable  Respiratory status: unassisted and spontaneous ventilation  Hydration status: euvolemic  Follow-up not needed.              Vitals Value Taken Time   /86 02/06/25 1034   Temp 36.4 °C (97.5 °F) 02/06/25 1004   Pulse 74 02/06/25 1034   Resp 18 02/06/25 1034   SpO2 98 % 02/06/25 1034         Event Time   Out of Recovery 10:48:38         Pain/Madhu Score: Madhu Score: 10 (2/6/2025 10:19 AM)

## 2025-02-06 NOTE — H&P
Short Stay Endoscopy History and Physical    PCP - Neo Garcia, DO     Procedure - EGD  ASA - per anesthesia  Mallampati - per anesthesia  History of Anesthesia problems - no  Family history Anesthesia problems -  no   Plan of anesthesia - General    HPI:  This is a 49 y.o. male here for evaluation of :     varices screening, cirrhosis of the liver. INR 1.0 and  in 11/2024.      ROS:  Constitutional: No fevers, chills, No weight loss  CV: No chest pain  Pulm: No cough, No shortness of breath  Ophtho: No vision changes  GI: see HPI  Derm: No rash    Medical History:  has a past medical history of AR (allergic rhinitis), Elevated liver enzymes, Family history of prostate cancer, Fatty liver, GERD (gastroesophageal reflux disease), Hyperlipidemia, Liver fibrosis, and Seizures.    Surgical History:  has a past surgical history that includes Cholecystectomy (2013) and Testicle surgery (Left).    Family History: family history includes Alcohol abuse in his father and paternal grandfather; Cancer in his maternal grandfather; Cirrhosis in his paternal grandfather.. Otherwise no colon cancer, inflammatory bowel disease, or GI malignancies.    Social History:  reports that he has never smoked. He quit smokeless tobacco use about 11 years ago. He reports that he does not currently use alcohol. He reports that he does not use drugs.    Review of patient's allergies indicates:  No Known Allergies    Medications:   Medications Prior to Admission   Medication Sig Dispense Refill Last Dose/Taking    atorvastatin (LIPITOR) 10 MG tablet Take 1 tablet (10 mg total) by mouth once daily. (Patient not taking: Reported on 12/4/2024) 90 tablet 3     evolocumab (REPATHA SURECLICK) 140 mg/mL PnIj Inject 1 mL (140 mg total) into the skin every 14 (fourteen) days. 2 mL 0        Physical Exam:    Vital Signs: There were no vitals filed for this visit.    General Appearance: Well appearing in no acute distress  Eyes:    No  scleral icterus  ENT: Neck supple, Lips, mucosa, and tongue normal; teeth and gums normal  Abdomen: Soft, non tender, non distended with normal bowel sounds. No hepatosplenomegaly, ascites, or mass.  Extremities: No edema  Skin: No rash    Labs:  Lab Results   Component Value Date    WBC 7.02 11/26/2024    HGB 15.5 11/26/2024    HCT 47.3 11/26/2024     11/26/2024    CHOL 256 (H) 09/06/2024    TRIG 112 09/06/2024    HDL 68 09/06/2024     (H) 11/26/2024    AST 91 (H) 11/26/2024     11/26/2024    K 4.4 11/26/2024     11/26/2024    CREATININE 0.9 11/26/2024    BUN 9 11/26/2024    CO2 25 11/26/2024    TSH 1.033 04/16/2019    PSA 0.74 04/16/2019    INR 1.0 11/26/2024    HGBA1C 5.0 09/06/2024       I have explained the risks and benefits of endoscopy procedures to the patient including but not limited to bleeding, perforation, infection, and death.  The patient was asked if they understand and allowed to ask any further questions to their satisfaction.      Stephanie Tillman DO

## 2025-02-06 NOTE — TRANSFER OF CARE
"Anesthesia Transfer of Care Note    Patient: Lance Hampton    Procedure(s) Performed: Procedure(s) (LRB):  EGD (ESOPHAGOGASTRODUODENOSCOPY) (N/A)    Patient location: GI    Anesthesia Type: general    Transport from OR: Transported from OR on room air with adequate spontaneous ventilation    Post pain: adequate analgesia    Post assessment: no apparent anesthetic complications and tolerated procedure well    Post vital signs: stable    Level of consciousness: sedated    Nausea/Vomiting: no nausea/vomiting    Complications: none    Transfer of care protocol was followed      Last vitals: Visit Vitals  /74 (BP Location: Left arm, Patient Position: Lying)   Pulse 78   Temp 36.7 °C (98.1 °F) (Tympanic)   Resp 16   Ht 5' 4" (1.626 m)   Wt 67.2 kg (148 lb 2.4 oz)   SpO2 97%   BMI 25.43 kg/m²     "

## 2025-02-06 NOTE — ANESTHESIA PREPROCEDURE EVALUATION
02/06/2025  Lance Hampton is a 49 y.o., male.      Pre-op Assessment    I have reviewed the Patient Summary Reports.     I have reviewed the Nursing Notes. I have reviewed the NPO Status.   I have reviewed the Medications.     Review of Systems  Anesthesia Hx:  No problems with previous Anesthesia                Social:  Social Alcohol Use, Non-Smoker       Cardiovascular:                   ECG has been reviewed.                            Hepatic/GI:     GERD Liver Disease,        Gerd       Liver Disease        Neurological:       Seizures           Seizure Disorder                              Physical Exam  General: Well nourished, Cooperative, Alert and Oriented    Airway:  Mallampati: II / I  Mouth Opening: Normal  TM Distance: Normal  Tongue: Normal  Neck ROM: Normal ROM    Dental:  Intact    Chest/Lungs:  Clear to auscultation, Normal Respiratory Rate    Heart:  Rate: Normal  Rhythm: Regular Rhythm  Sounds: Normal        Anesthesia Plan  Type of Anesthesia, risks & benefits discussed:    Anesthesia Type: Gen Natural Airway  Intra-op Monitoring Plan: Standard ASA Monitors  Induction:  IV  Informed Consent: Informed consent signed with the Patient and all parties understand the risks and agree with anesthesia plan.  All questions answered.   ASA Score: 2    Ready For Surgery From Anesthesia Perspective.     .

## 2025-02-06 NOTE — PROGRESS NOTES
MELD 3.0: 6 at 11/26/2024 10:18 AM  MELD-Na: 6 at 11/26/2024 10:18 AM  Calculated from:  Serum Creatinine: 0.9 mg/dL (Using min of 1 mg/dL) at 11/26/2024 10:18 AM  Serum Sodium: 139 mmol/L (Using max of 137 mmol/L) at 11/26/2024 10:18 AM  Total Bilirubin: 0.7 mg/dL (Using min of 1 mg/dL) at 11/26/2024 10:18 AM  Serum Albumin: 4.5 g/dL (Using max of 3.5 g/dL) at 11/26/2024 10:18 AM  INR(ratio): 1 at 11/26/2024 10:18 AM  Age at listing (hypothetical): 48 years  Sex: Male at 11/26/2024 10:18 AM

## 2025-02-06 NOTE — PROVATION PATIENT INSTRUCTIONS
Discharge Summary/Instructions after an Endoscopic Procedure  Patient Name: Lance Hampton  Patient MRN: [id number]  Patient YOB: 1975 Thursday, February 6, 2025  Ethan Swenson MD  Dear patient,  As a result of recent federal legislation (The Federal Cures Act), you may   receive lab or pathology results from your procedure in your [facility]   account before your physician is able to contact you. Your physician or   their representative will relay the results to you with their   recommendations at their soonest availability.  Thank you,  RESTRICTIONS:  During your procedure today, you received medications for sedation.  These   medications may affect your judgment, balance and coordination.  Therefore,   for 24 hours, you have the following restrictions:   - DO NOT drive a car, operate machinery, make legal/financial decisions,   sign important papers or drink alcohol.    ACTIVITY:  Today: no heavy lifting, straining or running due to procedural   sedation/anesthesia.  The following day: return to full activity including work.  DIET:  Eat and drink normally unless instructed otherwise.     TREATMENT FOR COMMON SIDE EFFECTS:  - Mild abdominal pain, nausea, belching, bloating or excessive gas:  rest,   eat lightly and use a heating pad.  - Sore Throat: treat with throat lozenges and/or gargle with warm salt   water.  - Because air was used during the procedure, expelling large amounts of air   from your rectum or belching is normal.  - If a bowel prep was taken, you may not have a bowel movement for 1-3 days.    This is normal.  SYMPTOMS TO WATCH FOR AND REPORT TO YOUR PHYSICIAN:  1. Abdominal pain or bloating, other than gas cramps.  2. Chest pain.  3. Back pain.  4. Signs of infection such as: chills or fever occurring within 24 hours   after the procedure.  5. Rectal bleeding, which would show as bright red, maroon, or black stools.   (A tablespoon of blood from the rectum is not serious, especially  if   hemorrhoids are present.)  6. Vomiting.  7. Weakness or dizziness.  GO DIRECTLY TO THE NEAREST EMERGENCY ROOM IF YOU HAVE ANY OF THE FOLLOWING:      Difficulty breathing              Chills and/or fever over 101 F   Persistent vomiting and/or vomiting blood   Severe abdominal pain   Severe chest pain   Black, tarry stools   Bleeding- more than one tablespoon   Any other symptom or condition that you feel may need urgent attention  Your doctor recommends these additional instructions:  If any biopsies were taken, your doctors clinic will contact you in 1 to 2   weeks with any results.  - Discharge patient to home.   - Follow an antireflux regimen indefinitely.   - Await pathology results.   - Telephone endoscopist for pathology results in 3 weeks.   - Use Protonix 40 mg once daily (or any other full strength proton pump   inhibitor) - best taken 45-60 minutes before your first protein containing   meal (Breakfast). Rx sent.  - Telephone endoscopist for pathology results in 3 weeks.   - Return to nurse practitioner.   - Repeat upper endoscopy in 2 years for surveillance and for surveillance   based on pathology results.   - The findings and recommendations were discussed with the patient.  For questions, problems or results please call your physician - Ethan Swenson MD at Work:  [phone].  [facility], EMERGENCY ROOM PHONE NUMBER: [phone]  IF A COMPLICATION OR EMERGENCY SITUATION ARISES AND YOU ARE UNABLE TO REACH   YOUR PHYSICIAN - GO DIRECTLY TO THE EMERGENCY ROOM.  Ethan Swenson MD  2/6/2025 10:05:13 AM  This report has been verified and signed electronically.  Dear patient,  As a result of recent federal legislation (The Federal Cures Act), you may   receive lab or pathology results from your procedure in your [facility]   account before your physician is able to contact you. Your physician or   their representative will relay the results to you with their   recommendations at  their soonest availability.  Thank you,  PROVATION

## 2025-02-13 ENCOUNTER — PATIENT MESSAGE (OUTPATIENT)
Dept: GASTROENTEROLOGY | Facility: CLINIC | Age: 50
End: 2025-02-13
Payer: COMMERCIAL

## 2025-02-13 LAB
FINAL PATHOLOGIC DIAGNOSIS: NORMAL
GROSS: NORMAL
Lab: NORMAL
MICROSCOPIC EXAM: NORMAL

## 2025-02-14 NOTE — PROGRESS NOTES
Lance your EGD pathology was benign no evidence of H pylori no evidence of Barretts esophagus but you do have some acid reflux changes.    Use Protonix 40 mg once daily (or any other full                          strength proton pump inhibitor) - best taken 45-60                          minutes before your first protein containing meal                          (Breakfast). Rx sent.                          - Telephone endoscopist for pathology results in 3                          weeks.                          - Return to nurse practitioner.                          - Repeat upper endoscopy in 2 years for                          surveillance and for surveillance based on                          pathology results.         1. STOMACH, BIOPSY:  - Gastric mucosa with minimal chronic inflammation and focally reactive/regenerative epithelial changes  - No evidence of Helicobacter-like organisms (an H. pylori immunohistochemical study is negative)    2. DISTAL ESOPHAGUS, 35-36 CM, BIOPSY:  - Columnar/squamocolumnar mucosa with mild chronic inflammation and focally reactive epithelial changes  - No evidence of intestinal metaplasia or dysplasia   Comment: Interp By John Contreras M.D., Signed on 02/13/2025 at 13:46  Microscopic Exam An H. pylori immunohistochemical study was performed on block 1A with an appropriate corresponding control.    Multiple deeper levels examined on block 2A. Select slides from part 2 were additionally reviewed by Dr. Cody Watkins, who concurs with the diagnostic interpretation.

## 2025-04-10 DIAGNOSIS — Z78.9 STATIN INTOLERANCE: ICD-10-CM

## 2025-04-10 DIAGNOSIS — K70.30 ALCOHOLIC CIRRHOSIS OF LIVER WITHOUT ASCITES: ICD-10-CM

## 2025-04-10 DIAGNOSIS — E78.5 HYPERLIPIDEMIA, UNSPECIFIED HYPERLIPIDEMIA TYPE: ICD-10-CM

## 2025-04-10 RX ORDER — EVOLOCUMAB 140 MG/ML
140 INJECTION, SOLUTION SUBCUTANEOUS
Qty: 2 ML | Refills: 0 | Status: ACTIVE | OUTPATIENT
Start: 2025-04-10 | End: 2025-05-08

## 2025-04-10 NOTE — TELEPHONE ENCOUNTER
.Refill Encounter    PCP Visits: Recent Visits  Date Type Provider Dept   08/28/24 Office Visit Neo Garcia DO [facility] Internal Medicine   Showing recent visits within past 360 days and meeting all other requirements  Future Appointments  No visits were found meeting these conditions.  Showing future appointments within next 720 days and meeting all other requirements      Last 3 Blood Pressure:   BP Readings from Last 3 Encounters:   02/06/25 125/86   08/28/24 129/80   05/26/24 133/78     Preferred Pharmacy:   [id number] [place] [address] AT [facility] [address]  [facility]  [facility] [address]  Phone: [phone] Fax: [phone]    Requested RX:  Requested Prescriptions     Pending Prescriptions Disp Refills    evolocumab (REPATHA SURECLICK) 140 mg/mL PnIj 2 mL 0     Sig: Inject 1 mL (140 mg total) into the skin every 14 (fourteen) days.      RX Route: Normal

## 2025-04-10 NOTE — TELEPHONE ENCOUNTER
Refill Routing Note   Medication(s) are not appropriate for processing by [facility] for the following reason(s):        New or recently adjusted medication    [facility] action(s):  Defer               Appointments  past 12m or future 3m with PCP    Date Provider   Last Visit   8/28/2024 Neo Garcia, DO   Next Visit   Visit date not found Neo Garcia, DO   ED visits in past 90 days: 0        Note composed:10:27 AM 04/10/2025

## 2025-04-10 NOTE — TELEPHONE ENCOUNTER
No care due was identified.  [facility] Embedded Care Due Messages. Reference number: [id number].   4/10/2025 9:58:59 AM CDT

## 2025-05-15 ENCOUNTER — TELEPHONE (OUTPATIENT)
Dept: HEPATOLOGY | Facility: CLINIC | Age: 50
End: 2025-05-15
Payer: COMMERCIAL

## 2025-05-22 DIAGNOSIS — E78.5 HYPERLIPIDEMIA, UNSPECIFIED HYPERLIPIDEMIA TYPE: ICD-10-CM

## 2025-05-22 DIAGNOSIS — K70.30 ALCOHOLIC CIRRHOSIS OF LIVER WITHOUT ASCITES: ICD-10-CM

## 2025-05-22 DIAGNOSIS — Z78.9 STATIN INTOLERANCE: ICD-10-CM

## 2025-05-22 RX ORDER — EVOLOCUMAB 140 MG/ML
140 INJECTION, SOLUTION SUBCUTANEOUS
Qty: 2 ML | Refills: 0 | Status: ACTIVE | OUTPATIENT
Start: 2025-05-22 | End: 2025-06-19

## 2025-05-22 NOTE — TELEPHONE ENCOUNTER
Refill Encounter    PCP Visits: Recent Visits  Date Type Provider Dept   08/28/24 Office Visit Neo Garcia DO [facility] Internal Medicine   Showing recent visits within past 360 days and meeting all other requirements  Future Appointments  No visits were found meeting these conditions.  Showing future appointments within next 720 days and meeting all other requirements      Last 3 Blood Pressure:   BP Readings from Last 3 Encounters:   02/06/25 125/86   08/28/24 129/80   05/26/24 133/78     Preferred Pharmacy:   [facility]   [id number] [facility] [address]    Requested RX:  Requested Prescriptions     Pending Prescriptions Disp Refills    evolocumab (REPATHA SURECLICK) 140 mg/mL PnIj 2 mL 0     Sig: Inject 1 mL (140 mg total) into the skin every 14 (fourteen) days.      RX Route: Normal

## 2025-05-22 NOTE — TELEPHONE ENCOUNTER
No care due was identified.  [facility] Embedded Care Due Messages. Reference number: [id number].   5/22/2025 12:22:53 PM CDT

## 2025-06-24 DIAGNOSIS — E78.5 HYPERLIPIDEMIA, UNSPECIFIED HYPERLIPIDEMIA TYPE: ICD-10-CM

## 2025-06-24 DIAGNOSIS — Z78.9 STATIN INTOLERANCE: ICD-10-CM

## 2025-06-24 DIAGNOSIS — K70.30 ALCOHOLIC CIRRHOSIS OF LIVER WITHOUT ASCITES: ICD-10-CM

## 2025-06-24 RX ORDER — EVOLOCUMAB 140 MG/ML
140 INJECTION, SOLUTION SUBCUTANEOUS
Qty: 2 ML | Refills: 0 | Status: ACTIVE | OUTPATIENT
Start: 2025-06-24 | End: 2025-07-25

## 2025-06-24 NOTE — TELEPHONE ENCOUNTER
.Refill Encounter    PCP Visits: Recent Visits  Date Type Provider Dept   08/28/24 Office Visit Neo Garcia DO [facility] Internal Medicine   Showing recent visits within past 360 days and meeting all other requirements  Future Appointments  No visits were found meeting these conditions.  Showing future appointments within next 720 days and meeting all other requirements      Last 3 Blood Pressure:   BP Readings from Last 3 Encounters:   02/06/25 125/86   08/28/24 129/80   05/26/24 133/78     Preferred Pharmacy:   [id number] [place] [address] AT [facility] [address]  [facility]  [facility] [address]  Phone: [phone] Fax: [phone]    Requested RX:  Requested Prescriptions     Pending Prescriptions Disp Refills    evolocumab (REPATHA SURECLICK) 140 mg/mL PnIj 2 mL 0     Sig: Inject 1 mL (140 mg total) into the skin every 14 (fourteen) days.      RX Route: Normal

## 2025-06-24 NOTE — TELEPHONE ENCOUNTER
Care Due:                  Date            Visit Type   Department     Provider  --------------------------------------------------------------------------------                                MYCHART                              ANNUAL                              CHECKUP/PHY  [facility] INTERNAL  Last Visit: 08-      [facility]       Neo Garcia  Next Visit: None Scheduled  None         None Found                                                            Last  Test          Frequency    Reason                     Performed    Due Date  --------------------------------------------------------------------------------    Lipid Panel.  12 months..  atorvastatin, evolocumab.  09- 09-    [facility] Embedded Care Due Messages. Reference number: [id number].   6/24/2025 11:30:24 AM CDT

## 2025-06-25 ENCOUNTER — PATIENT MESSAGE (OUTPATIENT)
Dept: INTERNAL MEDICINE | Facility: CLINIC | Age: 50
End: 2025-06-25
Payer: COMMERCIAL

## 2025-07-21 DIAGNOSIS — Z78.9 STATIN INTOLERANCE: ICD-10-CM

## 2025-07-21 DIAGNOSIS — K70.30 ALCOHOLIC CIRRHOSIS OF LIVER WITHOUT ASCITES: ICD-10-CM

## 2025-07-21 DIAGNOSIS — E78.5 HYPERLIPIDEMIA, UNSPECIFIED HYPERLIPIDEMIA TYPE: ICD-10-CM

## 2025-07-21 RX ORDER — EVOLOCUMAB 140 MG/ML
140 INJECTION, SOLUTION SUBCUTANEOUS
Qty: 2 ML | Refills: 5 | Status: ACTIVE | OUTPATIENT
Start: 2025-07-21 | End: 2026-01-05

## 2025-07-21 NOTE — TELEPHONE ENCOUNTER
.Refill Encounter    PCP Visits: Recent Visits  Date Type Provider Dept   08/28/24 Office Visit Weeks, Neo MACARIO DO [facility] Internal Medicine   Showing recent visits within past 360 days and meeting all other requirements  Future Appointments  Date Type Provider Dept   09/03/25 Appointment Weeks, Neo MACARIO DO [facility] Internal Medicine   Showing future appointments within next 720 days and meeting all other requirements      Last 3 Blood Pressure:   BP Readings from Last 3 Encounters:   02/06/25 125/86   08/28/24 129/80   05/26/24 133/78     Preferred Pharmacy:   [id number] [place], [place] - [address] AT [facility] [address]  [facility]  [facility] [address]  Phone: [phone] Fax: [phone]    Requested RX:  Requested Prescriptions     Pending Prescriptions Disp Refills    evolocumab (REPATHA SURECLICK) 140 mg/mL PnIj 2 mL 0     Sig: Inject 1 mL (140 mg total) into the skin every 14 (fourteen) days.      RX Route: Normal

## 2025-07-21 NOTE — TELEPHONE ENCOUNTER
No care due was identified.  [facility] Embedded Care Due Messages. Reference number: [id number].   7/21/2025 9:33:58 AM CDT

## 2025-08-13 ENCOUNTER — HOSPITAL ENCOUNTER (OUTPATIENT)
Dept: RADIOLOGY | Facility: HOSPITAL | Age: 50
Discharge: HOME | End: 2025-08-13
Payer: COMMERCIAL

## 2025-08-13 DIAGNOSIS — K74.60 CIRRHOSIS OF LIVER WITHOUT ASCITES, UNSPECIFIED HEPATIC CIRRHOSIS TYPE: ICD-10-CM

## 2025-08-13 PROCEDURE — 76705 ECHO EXAM OF ABDOMEN: CPT | Mod: TC

## 2025-08-13 PROCEDURE — 76705 ECHO EXAM OF ABDOMEN: CPT | Mod: 26,,, | Performed by: STUDENT IN AN ORGANIZED HEALTH CARE EDUCATION/TRAINING PROGRAM

## 2025-08-20 ENCOUNTER — TELEPHONE (OUTPATIENT)
Dept: HEPATOLOGY | Facility: CLINIC | Age: 50
End: 2025-08-20

## 2025-08-20 ENCOUNTER — OFFICE VISIT (OUTPATIENT)
Dept: HEPATOLOGY | Facility: CLINIC | Age: 50
End: 2025-08-20
Payer: COMMERCIAL

## 2025-08-20 VITALS — HEIGHT: 64 IN | BODY MASS INDEX: 25.27 KG/M2 | WEIGHT: 148 LBS

## 2025-08-20 DIAGNOSIS — F10.10 ALCOHOL ABUSE: ICD-10-CM

## 2025-08-20 DIAGNOSIS — F10.90 METABOLIC DYSFUNCTION-ASSOCIATED STEATOTIC LIVER DISEASE AND INCREASED ALCOHOL INTAKE (METALD): ICD-10-CM

## 2025-08-20 DIAGNOSIS — K76.0 METABOLIC DYSFUNCTION-ASSOCIATED STEATOTIC LIVER DISEASE AND INCREASED ALCOHOL INTAKE (METALD): ICD-10-CM

## 2025-08-20 DIAGNOSIS — E66.3 OVERWEIGHT (BMI 25.0-29.9): ICD-10-CM

## 2025-08-20 DIAGNOSIS — R74.8 ELEVATED LIVER ENZYMES: ICD-10-CM

## 2025-08-20 DIAGNOSIS — D18.03 LIVER HEMANGIOMA: ICD-10-CM

## 2025-08-20 DIAGNOSIS — K74.60 CIRRHOSIS OF LIVER WITHOUT ASCITES, UNSPECIFIED HEPATIC CIRRHOSIS TYPE: Primary | ICD-10-CM

## 2025-08-20 PROCEDURE — 3008F BODY MASS INDEX DOCD: CPT | Mod: CPTII,95,,

## 2025-08-20 PROCEDURE — 98006 SYNCH AUDIO-VIDEO EST MOD 30: CPT | Mod: 95,,,

## 2025-08-20 PROCEDURE — 1159F MED LIST DOCD IN RCRD: CPT | Mod: CPTII,95,,

## 2025-08-20 PROCEDURE — 1160F RVW MEDS BY RX/DR IN RCRD: CPT | Mod: CPTII,95,,

## 2025-08-21 ENCOUNTER — PATIENT MESSAGE (OUTPATIENT)
Dept: ADMINISTRATIVE | Facility: OTHER | Age: 50
End: 2025-08-21
Payer: COMMERCIAL

## 2025-09-03 ENCOUNTER — OFFICE VISIT (OUTPATIENT)
Dept: INTERNAL MEDICINE | Facility: CLINIC | Age: 50
End: 2025-09-03
Payer: COMMERCIAL

## 2025-09-03 VITALS
HEART RATE: 75 BPM | OXYGEN SATURATION: 98 % | WEIGHT: 152.31 LBS | DIASTOLIC BLOOD PRESSURE: 80 MMHG | HEIGHT: 64 IN | BODY MASS INDEX: 26 KG/M2 | SYSTOLIC BLOOD PRESSURE: 139 MMHG

## 2025-09-03 DIAGNOSIS — E78.5 HYPERLIPIDEMIA, UNSPECIFIED HYPERLIPIDEMIA TYPE: Primary | ICD-10-CM

## 2025-09-03 DIAGNOSIS — Z00.00 ROUTINE HEALTH MAINTENANCE: ICD-10-CM

## 2025-09-03 DIAGNOSIS — K21.9 GASTROESOPHAGEAL REFLUX DISEASE, UNSPECIFIED WHETHER ESOPHAGITIS PRESENT: ICD-10-CM

## 2025-09-03 DIAGNOSIS — K70.30 ALCOHOLIC CIRRHOSIS OF LIVER WITHOUT ASCITES: ICD-10-CM

## 2025-09-03 PROCEDURE — 99999 PR PBB SHADOW E&M-EST. PATIENT-LVL III: CPT | Mod: PBBFAC,,, | Performed by: FAMILY MEDICINE

## 2025-09-03 RX ORDER — PANTOPRAZOLE SODIUM 40 MG/1
40 TABLET, DELAYED RELEASE ORAL DAILY
Qty: 90 TABLET | Refills: 3 | Status: SHIPPED | OUTPATIENT
Start: 2025-09-03 | End: 2026-09-03

## 2025-09-03 RX ORDER — NALTREXONE HYDROCHLORIDE 50 MG/1
50 TABLET, FILM COATED ORAL DAILY
Qty: 30 TABLET | Refills: 5 | Status: SHIPPED | OUTPATIENT
Start: 2025-09-03 | End: 2025-10-03